# Patient Record
Sex: MALE | Race: WHITE | NOT HISPANIC OR LATINO | Employment: OTHER | URBAN - METROPOLITAN AREA
[De-identification: names, ages, dates, MRNs, and addresses within clinical notes are randomized per-mention and may not be internally consistent; named-entity substitution may affect disease eponyms.]

---

## 2019-10-21 ENCOUNTER — HOSPITAL ENCOUNTER (OUTPATIENT)
Facility: MEDICAL CENTER | Age: 84
DRG: 247 | End: 2019-10-21
Admitting: HOSPITALIST
Payer: MEDICARE

## 2019-10-22 ENCOUNTER — HOSPITAL ENCOUNTER (EMERGENCY)
Facility: MEDICAL CENTER | Age: 84
DRG: 247 | End: 2019-10-22
Attending: EMERGENCY MEDICINE
Payer: MEDICARE

## 2019-10-22 ENCOUNTER — APPOINTMENT (OUTPATIENT)
Dept: CARDIOLOGY | Facility: MEDICAL CENTER | Age: 84
DRG: 247 | End: 2019-10-22
Attending: INTERNAL MEDICINE
Payer: MEDICARE

## 2019-10-22 ENCOUNTER — HOSPITAL ENCOUNTER (INPATIENT)
Facility: MEDICAL CENTER | Age: 84
LOS: 2 days | DRG: 247 | End: 2019-10-24
Attending: HOSPITALIST | Admitting: HOSPITALIST
Payer: MEDICARE

## 2019-10-22 ENCOUNTER — APPOINTMENT (OUTPATIENT)
Dept: RADIOLOGY | Facility: MEDICAL CENTER | Age: 84
DRG: 247 | End: 2019-10-22
Attending: EMERGENCY MEDICINE
Payer: MEDICARE

## 2019-10-22 ENCOUNTER — HOSPITAL ENCOUNTER (OUTPATIENT)
Dept: RADIOLOGY | Facility: MEDICAL CENTER | Age: 84
End: 2019-10-22

## 2019-10-22 VITALS
HEART RATE: 93 BPM | DIASTOLIC BLOOD PRESSURE: 91 MMHG | TEMPERATURE: 98.3 F | OXYGEN SATURATION: 93 % | WEIGHT: 210 LBS | HEIGHT: 66 IN | SYSTOLIC BLOOD PRESSURE: 168 MMHG | RESPIRATION RATE: 20 BRPM | BODY MASS INDEX: 33.75 KG/M2

## 2019-10-22 DIAGNOSIS — I21.4 NSTEMI (NON-ST ELEVATED MYOCARDIAL INFARCTION) (HCC): ICD-10-CM

## 2019-10-22 PROBLEM — I48.91 ATRIAL FIBRILLATION (HCC): Status: ACTIVE | Noted: 2019-10-22

## 2019-10-22 PROBLEM — I10 ESSENTIAL HYPERTENSION: Status: ACTIVE | Noted: 2019-10-22

## 2019-10-22 PROBLEM — E78.5 HYPERLIPIDEMIA: Status: ACTIVE | Noted: 2019-10-22

## 2019-10-22 LAB
APTT PPP: 128.3 SEC (ref 24.7–36)
APTT PPP: 49.7 SEC (ref 24.7–36)
APTT PPP: 92.3 SEC (ref 24.7–36)
EKG IMPRESSION: NORMAL
EST. AVERAGE GLUCOSE BLD GHB EST-MCNC: 148 MG/DL
HBA1C MFR BLD: 6.8 % (ref 0–5.6)
INR PPP: 1.12 (ref 0.87–1.13)
LV EJECT FRACT  99904: 70
PROTHROMBIN TIME: 14.7 SEC (ref 12–14.6)
TROPONIN T SERPL-MCNC: 195 NG/L (ref 6–19)
TROPONIN T SERPL-MCNC: 243 NG/L (ref 6–19)
TSH SERPL DL<=0.005 MIU/L-ACNC: 0.59 UIU/ML (ref 0.38–5.33)

## 2019-10-22 PROCEDURE — 93306 TTE W/DOPPLER COMPLETE: CPT

## 2019-10-22 PROCEDURE — 302449 STATCHG TRIAGE ONLY (STATISTIC)

## 2019-10-22 PROCEDURE — 84484 ASSAY OF TROPONIN QUANT: CPT | Mod: 91

## 2019-10-22 PROCEDURE — 700117 HCHG RX CONTRAST REV CODE 255: Performed by: INTERNAL MEDICINE

## 2019-10-22 PROCEDURE — 700111 HCHG RX REV CODE 636 W/ 250 OVERRIDE (IP): Performed by: INTERNAL MEDICINE

## 2019-10-22 PROCEDURE — 93005 ELECTROCARDIOGRAM TRACING: CPT | Performed by: EMERGENCY MEDICINE

## 2019-10-22 PROCEDURE — 83036 HEMOGLOBIN GLYCOSYLATED A1C: CPT

## 2019-10-22 PROCEDURE — 71045 X-RAY EXAM CHEST 1 VIEW: CPT

## 2019-10-22 PROCEDURE — 700102 HCHG RX REV CODE 250 W/ 637 OVERRIDE(OP): Performed by: INTERNAL MEDICINE

## 2019-10-22 PROCEDURE — 99223 1ST HOSP IP/OBS HIGH 75: CPT | Performed by: INTERNAL MEDICINE

## 2019-10-22 PROCEDURE — A9270 NON-COVERED ITEM OR SERVICE: HCPCS | Performed by: INTERNAL MEDICINE

## 2019-10-22 PROCEDURE — 93306 TTE W/DOPPLER COMPLETE: CPT | Mod: 26 | Performed by: INTERNAL MEDICINE

## 2019-10-22 PROCEDURE — 84443 ASSAY THYROID STIM HORMONE: CPT

## 2019-10-22 PROCEDURE — 36415 COLL VENOUS BLD VENIPUNCTURE: CPT

## 2019-10-22 PROCEDURE — 85610 PROTHROMBIN TIME: CPT

## 2019-10-22 PROCEDURE — 99358 PROLONG SERVICE W/O CONTACT: CPT | Performed by: INTERNAL MEDICINE

## 2019-10-22 PROCEDURE — 85730 THROMBOPLASTIN TIME PARTIAL: CPT

## 2019-10-22 PROCEDURE — 770020 HCHG ROOM/CARE - TELE (206)

## 2019-10-22 PROCEDURE — 93010 ELECTROCARDIOGRAM REPORT: CPT | Performed by: INTERNAL MEDICINE

## 2019-10-22 RX ORDER — SENNOSIDES 8.6 MG
650 CAPSULE ORAL EVERY 6 HOURS PRN
COMMUNITY

## 2019-10-22 RX ORDER — NADOLOL 80 MG/1
80 TABLET ORAL 2 TIMES DAILY
Refills: 0 | Status: ON HOLD | COMMUNITY
Start: 2019-09-14 | End: 2019-10-24

## 2019-10-22 RX ORDER — NITROGLYCERIN 0.4 MG/1
0.4 TABLET SUBLINGUAL
Status: DISCONTINUED | OUTPATIENT
Start: 2019-10-22 | End: 2019-10-24 | Stop reason: HOSPADM

## 2019-10-22 RX ORDER — HEPARIN SODIUM 1000 [USP'U]/ML
3200 INJECTION, SOLUTION INTRAVENOUS; SUBCUTANEOUS PRN
Status: DISCONTINUED | OUTPATIENT
Start: 2019-10-22 | End: 2019-10-23

## 2019-10-22 RX ORDER — CEPHALEXIN 500 MG/1
500 CAPSULE ORAL 3 TIMES DAILY
Status: ON HOLD | COMMUNITY
End: 2019-10-22

## 2019-10-22 RX ORDER — ACETAMINOPHEN 325 MG/1
650 TABLET ORAL EVERY 4 HOURS PRN
Status: ON HOLD | COMMUNITY
End: 2019-10-22

## 2019-10-22 RX ORDER — ATORVASTATIN CALCIUM 80 MG/1
80 TABLET, FILM COATED ORAL EVERY EVENING
Status: DISCONTINUED | OUTPATIENT
Start: 2019-10-22 | End: 2019-10-24 | Stop reason: HOSPADM

## 2019-10-22 RX ORDER — AMOXICILLIN 250 MG
2 CAPSULE ORAL 2 TIMES DAILY
Status: DISCONTINUED | OUTPATIENT
Start: 2019-10-22 | End: 2019-10-24 | Stop reason: HOSPADM

## 2019-10-22 RX ORDER — POLYETHYLENE GLYCOL 3350 17 G/17G
1 POWDER, FOR SOLUTION ORAL
Status: DISCONTINUED | OUTPATIENT
Start: 2019-10-22 | End: 2019-10-24 | Stop reason: HOSPADM

## 2019-10-22 RX ORDER — CARVEDILOL 6.25 MG/1
6.25 TABLET ORAL 2 TIMES DAILY WITH MEALS
Status: DISCONTINUED | OUTPATIENT
Start: 2019-10-22 | End: 2019-10-24

## 2019-10-22 RX ORDER — ATORVASTATIN CALCIUM 40 MG/1
40 TABLET, FILM COATED ORAL EVERY EVENING
Status: DISCONTINUED | OUTPATIENT
Start: 2019-10-22 | End: 2019-10-22

## 2019-10-22 RX ORDER — NADOLOL 40 MG/1
80 TABLET ORAL 2 TIMES DAILY
Status: ON HOLD | COMMUNITY
End: 2019-10-22

## 2019-10-22 RX ORDER — HYDRALAZINE HYDROCHLORIDE 20 MG/ML
20 INJECTION INTRAMUSCULAR; INTRAVENOUS EVERY 6 HOURS PRN
Status: DISCONTINUED | OUTPATIENT
Start: 2019-10-22 | End: 2019-10-24 | Stop reason: HOSPADM

## 2019-10-22 RX ORDER — HEPARIN SODIUM 5000 [USP'U]/100ML
INJECTION, SOLUTION INTRAVENOUS CONTINUOUS
Status: DISCONTINUED | OUTPATIENT
Start: 2019-10-22 | End: 2019-10-23

## 2019-10-22 RX ORDER — BISACODYL 10 MG
10 SUPPOSITORY, RECTAL RECTAL
Status: DISCONTINUED | OUTPATIENT
Start: 2019-10-22 | End: 2019-10-24 | Stop reason: HOSPADM

## 2019-10-22 RX ORDER — AMLODIPINE BESYLATE 2.5 MG/1
2.5 TABLET ORAL DAILY
Status: ON HOLD | COMMUNITY
End: 2019-10-24

## 2019-10-22 RX ORDER — ASPIRIN 325 MG
650 TABLET ORAL ONCE
Status: ON HOLD | COMMUNITY
End: 2019-10-24

## 2019-10-22 RX ADMIN — CARVEDILOL 6.25 MG: 6.25 TABLET, FILM COATED ORAL at 17:48

## 2019-10-22 RX ADMIN — ATORVASTATIN CALCIUM 80 MG: 80 TABLET, FILM COATED ORAL at 17:48

## 2019-10-22 RX ADMIN — HEPARIN SODIUM 1200 UNITS/HR: 5000 INJECTION, SOLUTION INTRAVENOUS at 03:24

## 2019-10-22 RX ADMIN — HUMAN ALBUMIN MICROSPHERES AND PERFLUTREN 3 ML: 10; .22 INJECTION, SOLUTION INTRAVENOUS at 16:30

## 2019-10-22 RX ADMIN — CARVEDILOL 6.25 MG: 6.25 TABLET, FILM COATED ORAL at 04:29

## 2019-10-22 ASSESSMENT — ENCOUNTER SYMPTOMS
DIZZINESS: 0
NAUSEA: 0
BACK PAIN: 0
PALPITATIONS: 0
SPUTUM PRODUCTION: 0
FOCAL WEAKNESS: 0
SEIZURES: 0
MYALGIAS: 0
BLURRED VISION: 0
VOMITING: 0
BRUISES/BLEEDS EASILY: 0
CHILLS: 0
HEADACHES: 0
COUGH: 0
DIAPHORESIS: 1
DIARRHEA: 0
ABDOMINAL PAIN: 0
SORE THROAT: 0
FEVER: 0
BLOOD IN STOOL: 0
NECK PAIN: 0
WHEEZING: 0
SHORTNESS OF BREATH: 1
FLANK PAIN: 0

## 2019-10-22 ASSESSMENT — LIFESTYLE VARIABLES
EVER_SMOKED: YES
AVERAGE NUMBER OF DAYS PER WEEK YOU HAVE A DRINK CONTAINING ALCOHOL: 0
EVER_SMOKED: YES
TOTAL SCORE: 0
ON A TYPICAL DAY WHEN YOU DRINK ALCOHOL HOW MANY DRINKS DO YOU HAVE: 0
EVER FELT BAD OR GUILTY ABOUT YOUR DRINKING: NO
HAVE YOU EVER FELT YOU SHOULD CUT DOWN ON YOUR DRINKING: NO
HAVE PEOPLE ANNOYED YOU BY CRITICIZING YOUR DRINKING: NO
EVER HAD A DRINK FIRST THING IN THE MORNING TO STEADY YOUR NERVES TO GET RID OF A HANGOVER: NO
TOTAL SCORE: 0
DOES PATIENT WANT TO STOP DRINKING: NO
ALCOHOL_USE: NO
CONSUMPTION TOTAL: NEGATIVE
TOTAL SCORE: 0
HOW MANY TIMES IN THE PAST YEAR HAVE YOU HAD 5 OR MORE DRINKS IN A DAY: 0

## 2019-10-22 ASSESSMENT — COGNITIVE AND FUNCTIONAL STATUS - GENERAL
SUGGESTED CMS G CODE MODIFIER DAILY ACTIVITY: CI
SUGGESTED CMS G CODE MODIFIER MOBILITY: CI
DRESSING REGULAR LOWER BODY CLOTHING: A LITTLE
DAILY ACTIVITIY SCORE: 23
MOBILITY SCORE: 23
CLIMB 3 TO 5 STEPS WITH RAILING: A LITTLE

## 2019-10-22 ASSESSMENT — PAIN DESCRIPTION - DESCRIPTORS: DESCRIPTORS: PRESSURE

## 2019-10-22 ASSESSMENT — COPD QUESTIONNAIRES
COPD SCREENING SCORE: 5
DO YOU EVER COUGH UP ANY MUCUS OR PHLEGM?: NO/ONLY WITH OCCASIONAL COLDS OR INFECTIONS
HAVE YOU SMOKED AT LEAST 100 CIGARETTES IN YOUR ENTIRE LIFE: YES
COPD SCREENING SCORE: 5
IN THE PAST 12 MONTHS DO YOU DO LESS THAN YOU USED TO BECAUSE OF YOUR BREATHING PROBLEMS: DISAGREE/UNSURE
DURING THE PAST 4 WEEKS HOW MUCH DID YOU FEEL SHORT OF BREATH: SOME OF THE TIME
DO YOU EVER COUGH UP ANY MUCUS OR PHLEGM?: NO/ONLY WITH OCCASIONAL COLDS OR INFECTIONS
DURING THE PAST 4 WEEKS HOW MUCH DID YOU FEEL SHORT OF BREATH: SOME OF THE TIME
HAVE YOU SMOKED AT LEAST 100 CIGARETTES IN YOUR ENTIRE LIFE: YES

## 2019-10-22 ASSESSMENT — PATIENT HEALTH QUESTIONNAIRE - PHQ9
SUM OF ALL RESPONSES TO PHQ9 QUESTIONS 1 AND 2: 0
1. LITTLE INTEREST OR PLEASURE IN DOING THINGS: NOT AT ALL
2. FEELING DOWN, DEPRESSED, IRRITABLE, OR HOPELESS: NOT AT ALL

## 2019-10-22 NOTE — ED TRIAGE NOTES
"Melissa Rust     Chief Complaint   Patient presents with   • Sent by MD     [facility]   • Chest Pain     elevated troponin       Patient biba for above complaint. Patient c/o chest pressure over the last couple days, but around 1230PM yesterday, the pain increased and patient called 911.    [facility] gave patient 324 ASA, NTG 0.4mg and patient states his pain subsided. Patients original troponin was .001 per then elevated to 1.39 per [facility]. Patient was then started on a heparin drip at 1000 units/min. Patient has received a total of 125mL of heparin on arrival, denies CP or SOB at this time.     Patient is AOx4, Hx A fib and HTN    Blood Pressure : 150/100, Pulse: 83, Respiration: 20, Temperature: 36.8 °C (98.3 °F), Height: 167.6 cm (5' 6\"), Weight: 95.3 kg (210 lb), BMI (Calculated): 33.89, BSA (Calculated): 2.1, Pulse Oximetry: 97 %, O2 (LPM): 0, O2 Delivery: None (Room Air)   "

## 2019-10-22 NOTE — ED PROVIDER NOTES
ED Provider Note    Patient was supposed to be a direct admit upstairs and instead was brought down the emergency department.  Patient is stable.  Patient was not seen by emergency provider and will be directly admitted as planned.

## 2019-10-22 NOTE — PROGRESS NOTES
Bedside report received, assumed pt care @5504. Pt A&Ox4. He denies any pain at this time. POC discussed, he verbalized understanding. Call light within reach

## 2019-10-22 NOTE — ASSESSMENT & PLAN NOTE
- Started on Coreg and IV heparin  - echo pending  - will need anticoagulation post cath procedure

## 2019-10-22 NOTE — H&P
Hospital Medicine History & Physical Note    Date of Service  10/22/2019    Primary Care Physician  No primary care provider on file.    Consultants  Cardiology    Code Status  Full code    Chief Complaint  Chest pain    History of Presenting Illness  85 y.o. male who presented 10/22/2019 with chest pain that started yesterday.  The patient reports substernal chest pressure without radiation associated with shortness of breath.  He also reported symptoms of diaphoresis.  He denied any relieving or exacerbating factors.  He denies any fevers, chills, cough or lightheadedness.  Denies any previous history of MI, stroke or PE.  He is not on any blood thinners.  He does not smoke.  He denies any family history of MI.      EKG interpreted by me reveals atrial fibrillation with controlled ventricular response.  No ST elevation or ST depression noted    He presented to [facility]. I have reviewed the medical records from the transferring facility which are summarized as follows:    Sodium 139, potassium 4.1, chloride 105, CO2 28, anion gap 6, glucose 164, BUN 11, creatinine 0.8, calcium 8.9, alkaline phosphatase 127, ALT 18, AST 26, albumin 3.5, total protein 7.8, total bili 2.3, mag 1.9    Troponin 0.017> 0.325> 1.399    WBC 6.2, hemoglobin 19.5, hematocrit 57.5, , platelets 184  PT 10.7, INR 1.1    UA: Trace leukocyte esterase, nitrite negative, WBC 0-5, bacteria trace    Chest x-ray: No acute cardiopulmonary process    Patient was given aspirin 324 mg.  Patient was started on IV heparin and beta-blockers.  The transferring physician discussed the case with cardiology , who will consult on the patient    Review of Systems  Review of Systems   Constitutional: Positive for diaphoresis. Negative for chills and fever.   HENT: Negative for hearing loss and sore throat.    Eyes: Negative for blurred vision.   Respiratory: Positive for shortness of breath. Negative for cough, sputum production and  wheezing.    Cardiovascular: Positive for chest pain. Negative for palpitations and leg swelling.   Gastrointestinal: Negative for abdominal pain, blood in stool, diarrhea, nausea and vomiting.   Genitourinary: Negative for dysuria, flank pain and urgency.   Musculoskeletal: Negative for back pain, joint pain, myalgias and neck pain.   Skin: Negative for rash.   Neurological: Negative for dizziness, focal weakness, seizures and headaches.   Endo/Heme/Allergies: Does not bruise/bleed easily.   Psychiatric/Behavioral: Negative for suicidal ideas.   All other systems reviewed and are negative.      Past Medical History   has a past medical history of Atrial fibrillation (HCC) and Hypertension.    Surgical History  No pertinent surgical history    Family History  No pertinent family history    Social History   reports that he has never smoked. He has never used smokeless tobacco. He reports that he drank alcohol. He reports that he does not use drugs.    Allergies  NKDA    Medications  Prior to Admission Medications   Prescriptions Last Dose Informant Patient Reported? Taking?   acetaminophen (TYLENOL) 325 MG Tab 10/22/2019 at 0800  Yes Yes   Sig: Take 650 mg by mouth every four hours as needed.   amLODIPine (NORVASC) 2.5 MG Tab 10/22/2019 at 1500  Yes Yes   Sig: Take 2.5 mg by mouth 2 times a day.   aspirin EC (ECOTRIN) 81 MG Tablet Delayed Response   Yes No   Sig: Take 81 mg by mouth every day.   cephALEXin (KEFLEX) 500 MG Cap   Yes Yes   Sig: Take 500 mg by mouth 3 times a day.   nadolol (CORGARD) 40 MG Tab 10/22/2019 at 1500  Yes Yes   Sig: Take 80 mg by mouth 2 times a day.      Facility-Administered Medications: None       Physical Exam  Temp:  [36.7 °C (98 °F)] 36.7 °C (98 °F)  Pulse:  [84-98] 84  Resp:  [16] 16  BP: (164)/(105) 164/105  SpO2:  [95 %-99 %] 99 %    Physical Exam   Constitutional: He is oriented to person, place, and time. He appears well-developed and well-nourished. No distress.   HENT:   Head:  Normocephalic and atraumatic.   Mouth/Throat: Oropharynx is clear and moist.   Eyes: Pupils are equal, round, and reactive to light. Conjunctivae are normal. No scleral icterus.   Neck: Normal range of motion. Neck supple.   Cardiovascular: Normal rate and normal heart sounds.   Irregular   Pulmonary/Chest: Effort normal and breath sounds normal. No respiratory distress. He has no wheezes. He has no rales.   Abdominal: Soft. Bowel sounds are normal. He exhibits no distension. There is no tenderness. There is no rebound.   Musculoskeletal: Normal range of motion. He exhibits no edema or tenderness.   Lymphadenopathy:     He has no cervical adenopathy.   Neurological: He is alert and oriented to person, place, and time. No cranial nerve deficit. Coordination normal.   Skin: Skin is warm. No rash noted.   Psychiatric: He has a normal mood and affect. His behavior is normal.   Nursing note and vitals reviewed.      Laboratory:          No results for input(s): ALTSGPT, ASTSGOT, ALKPHOSPHAT, TBILIRUBIN, DBILIRUBIN, GAMMAGT, AMYLASE, LIPASE, ALB, PREALBUMIN, GLUCOSE in the last 72 hours.      No results for input(s): NTPROBNP in the last 72 hours.      No results for input(s): TROPONINT in the last 72 hours.    Urinalysis:    No results found     Imaging:  No orders to display         Assessment/Plan:  I anticipate this patient will require at least two midnights for appropriate medical management, necessitating inpatient admission.    NSTEMI (non-ST elevated myocardial infarction) ([facility])- (present on admission)  Assessment & Plan  Patient will be admitted to the telemetry unit with close cardiac monitoring, serial EKG and troponin  Patient has been given full dose of aspirin and is started on IV heparin, monitor APTT  I will start the patient on coreg 6.25 mg twice daily and atorvastatin 80 mg daily  Check 2D echo, lipid panel, TSH and hemoglobin A1c  Nitro and morphine when necessary for chest pain  Cardiology has been  consulted      Atrial fibrillation (HCC)- (present on admission)  Assessment & Plan  Started on Coreg and IV heparin  Check 2D echo  Check TSH    Hyperlipidemia- (present on admission)  Assessment & Plan  Started on atorvastatin 80    Essential hypertension- (present on admission)  Assessment & Plan  Uncontrolled  I started the patient on Coreg 6.25 mg twice daily  IV hydralazine PRN for SBP greater than 160      VTE prophylaxis: Heparin    I spent a total of 30 minutes of non face to face time performing additional research, reviewing medical records from transferring facility, discussing plan of care with other healthcare providers. Start time: 1 10 am. End time: 1 40 am

## 2019-10-22 NOTE — ASSESSMENT & PLAN NOTE
Uncontrolled, labile  - tolerating coreg 6.25mg BID  - starting lisinopril 10mg daily, with holding parameters  - IV hydralazine PRN for SBP greater than 160

## 2019-10-22 NOTE — CONSULTS
Reason of Consult: NSTEMI    Consulting Physician: Dr. Apodaca    HPI:  85M with a history of hyperlipidemia and hypertension presents from [facility] entrance for with the diagnosis of non-STEMI.  Presented 10/22/2019 to the outside facility noting substernal chest pressure without radiation associated with shortness of breath and diaphoresis that occurred suddenly and lasted until his presentation to the outside facility.  Had never occurred before.  He is a non-smoker does not drink excessively does not do drugs and denies a family history of precocious CAD.  He is currently asymptomatic without recurrent chest pain.  Troponins were mildly abnormal and EKG interpreted as below.    Past Medical History:   Diagnosis Date   • Atrial fibrillation (HCC)    • Hypertension        Social History     Socioeconomic History   • Marital status:      Spouse name: Not on file   • Number of children: Not on file   • Years of education: Not on file   • Highest education level: Not on file   Occupational History   • Not on file   Social Needs   • Financial resource strain: Not on file   • Food insecurity:     Worry: Not on file     Inability: Not on file   • Transportation needs:     Medical: Not on file     Non-medical: Not on file   Tobacco Use   • Smoking status: Never Smoker   • Smokeless tobacco: Never Used   Substance and Sexual Activity   • Alcohol use: Not Currently   • Drug use: Never   • Sexual activity: Not on file   Lifestyle   • Physical activity:     Days per week: Not on file     Minutes per session: Not on file   • Stress: Not on file   Relationships   • Social connections:     Talks on phone: Not on file     Gets together: Not on file     Attends Anabaptist service: Not on file     Active member of club or organization: Not on file     Attends meetings of clubs or organizations: Not on file     Relationship status: Not on file   • Intimate partner violence:     Fear of current or ex partner: Not  on file     Emotionally abused: Not on file     Physically abused: Not on file     Forced sexual activity: Not on file   Other Topics Concern   • Not on file   Social History Narrative   • Not on file       No current facility-administered medications on file prior to encounter.      Current Outpatient Medications on File Prior to Encounter   Medication Sig Dispense Refill   • amLODIPine (NORVASC) 2.5 MG Tab Take 2.5 mg by mouth every day.     • aspirin (ASA) 325 MG Tab Take 650 mg by mouth Once.     • acetaminophen (TYLENOL 8 HOUR) 650 MG CR tablet Take 650 mg by mouth every 6 hours as needed.     • nadolol (CORGARD) 80 MG tablet Take 80 mg by mouth 2 Times a Day.  0       Current Facility-Administered Medications   Medication Dose Frequency Provider Last Rate Last Dose   • senna-docusate (PERICOLACE or SENOKOT S) 8.6-50 MG per tablet 2 Tab  2 Tab BID Donald Lee M.D.        And   • polyethylene glycol/lytes (MIRALAX) PACKET 1 Packet  1 Packet QDAY PRN Donald Lee M.D.        And   • magnesium hydroxide (MILK OF MAGNESIA) suspension 30 mL  30 mL QDAY PRN Donald Lee M.D.        And   • bisacodyl (DULCOLAX) suppository 10 mg  10 mg QDAY PRN Donald Lee M.D.       • Respiratory Care per Protocol   Continuous RT Donald Lee M.D.       • [START ON 10/23/2019] aspirin EC (ECOTRIN) tablet 81 mg  81 mg DAILY Donald Lee M.D.       • nitroglycerin (NITROSTAT) tablet 0.4 mg  0.4 mg Q5 MIN PRN Donald Lee M.D.       • heparin injection 3,200 Units  3,200 Units PRN Donald Lee M.D.        And   • heparin infusion 25,000 units in 500 mL 0.45% NACL   Continuous Donald Lee M.D. 24 mL/hr at 10/22/19 1119 1,200 Units/hr at 10/22/19 1119   • Influenza Vaccine High-Dose pf injection 0.5 mL  0.5 mL Once Donald Lee M.D.       • carvedilol (COREG) tablet 6.25 mg  6.25 mg BID WITH MEALS Donald Lee M.D.   6.25 mg at 10/22/19 0429   • atorvastatin (LIPITOR) tablet 80 mg  80 mg Q EVENING Donald Lee M.D.       • hydrALAZINE  "(APRESOLINE) injection 20 mg  20 mg Q6HRS PRN Donald Lee M.D.         Last reviewed on 10/22/2019 11:02 AM by Irma Ho, JwT    Allergies: Juniper tar    No family history on file.    ROS: As per HPI all other systems reviewed and negative     Physical Exam   Blood pressure 113/78, pulse 86, temperature 37.1 °C (98.7 °F), temperature source Temporal, resp. rate 16, height 1.676 m (5' 6\"), weight 97.2 kg (214 lb 4.6 oz), SpO2 94 %.    Constitutional: Appears well-developed.   HENT: Normocephalic and atraumatic. No scleral icterus.   Neck: No JVD present.   Cardiovascular: Normal rate. Exam reveals no gallop and no friction rub. No murmur heard.   Pulmonary/Chest: CTAB    Abdominal: S/NT/ND BS+   Musculoskeletal:  Pulses present. No atrophy. Strength normal.  Extremities: Exhibits no edema. No clubbing or cyanosis.   Skin: Skin is warm and dry.   Neuro: Non-focal, CN 2-12 intact grossly      Intake/Output Summary (Last 24 hours) at 10/22/2019 1306  Last data filed at 10/22/2019 0748  Gross per 24 hour   Intake --   Output 200 ml   Net -200 ml               Recent Labs     10/22/19  0252 10/22/19  0957   APTT 49.7* 92.3*   INR 1.12  --                    EKG (10/22/2019):  I have personally reviewed the EKG this visit and discussed with the patient. It shows atrial fibrillation 91 bpm with nonspecific ST-T wave changes.    Imaging reviewed    Impressions:  1.  Non-STEMI  2.  Atrial fibrillation with controlled ventricular response  3.  Hypertension  4.  Hyperlipidemia    Recommendations:  It appears that the patient has suffered a non-STEMI without clear provocation.  The atrial fibrillation is previously unknown to the patient and it is possible he had new onset atrial fibrillation with an initial rapid rate subsequently improving to rate controlled AF that could have driven a demand event.  He has an elevated stroke risk score and therefore post procedure oral anticoagulation will be recommended for stroke " prevention.  I do recommend early coronary angiography and continuing his heparin infusion until such time as this can be completed.  Recommend guideline directed medical therapy as below as well.    1.  Echocardiogram  2.  N.p.o. after midnight for coronary angiography with possible PCI tomorrow  3.  Aspirin, high-dose statin, Coreg, initiate ACE inhibitor  4.  Post procedure oral anticoagulation will be addressed  5.  Recommend routine daily laboratory evaluation to include CBC and BM as well as coags.    Thank you for this interesting consultation. It was my pleasure to see Mleissa Rust today.    Yovani Tinajero MD, FACC, [facility]  Division of Interventional Cardiology  [facility]      Discussed with the referring physician and bedside nursing.

## 2019-10-22 NOTE — ED NOTES
Per Charge RN, patient was supposed to be a direct admit. Patient will be DC from ER and taken to 831-2. Updated patient on POC

## 2019-10-22 NOTE — PROGRESS NOTES
Pt brought to the floor from [facility]. Monitor place on pt, monitor room notified. Pt able to ambulate from gurney to bed with steady gait. POC discussed with pt, all questions answered at this time. Pt is on 0L O2. A/O x4. Pt is currently on a heparin drip. MD is aware patient has arrived and has updated his orders. Med rec has been complete and patient baseline vitals have been taken. Pt has no other needs at this time.

## 2019-10-22 NOTE — CARE PLAN
Problem: Communication  Goal: The ability to communicate needs accurately and effectively will improve  Outcome: PROGRESSING AS EXPECTED  Intervention: [place] patient and significant other/support system to call light to alert staff of needs  Flowsheets (Taken 10/22/2019 0340)  Oriented to:: All of the Following : Location of Bathroom, Visiting Policy, Unit Routine, Call Light and Bedside Controls, Bedside Rail Policy, Smoking Policy, Rights and Responsibilities, Bedside Report, and Patient Education Notebook  Note:   Pt educated on POC and medications. Pt verbalized understanding.      Problem: Safety  Goal: Will remain free from falls  Outcome: PROGRESSING AS EXPECTED  Intervention: Assess risk factors for falls  Flowsheets (Taken 10/22/2019 0340)  Pt Calls for Assistance: Yes  Fall Risk: Risk to Fall -  0 - 1 point  History of fall: 0  Mobility Status Assessment: 0-Ambulates & Transfers Independently. No Assistance Required  Risk for Injury-Any positive answers results in the pt being at high risk for fall related injury: Not Applicable  Note:   Pt bedside table and call-light are within reach, bed in lowest position and locked. Treaded socks on and pt has been educated on call light use and asked to call before getting up.

## 2019-10-22 NOTE — ED NOTES
Pt was to be a direct admit, charge Rn did not get this in report, pt will be discharged and taken to correct room.

## 2019-10-22 NOTE — PROGRESS NOTES
Pt came in with Heparin drip running at 1000 units/hr. Dr reordered drip, I verified with pharmacy and we are to stop the drip that is running and restart the heparin drip per our hospital protocol with a rebolus. Aptt reordered. Unable to draw labs at this time as the patient armband has incorrect information. Patient armband reordered. Will restart heparin drip per hospital protocol when APTT redrawn. MD updated.

## 2019-10-22 NOTE — PROGRESS NOTES
2 RN Skin Check    2 RN skin check complete. With Jose A  Devices in place: NA.  Skin assessed under devices: N\A.  Confirmed pressure ulcers found on: NA.  New potential pressure ulcers noted on NA. Wound consult placed N/A.  The following interventions in place Pillows.    Pt has red and blanching heels bilaterally.   Elbows dry  Ears pink and blanching bilaterally    No other skin breakdown noted at this time

## 2019-10-22 NOTE — PROGRESS NOTES
Patient was seen and examined by my colleague after midnight. Please refer to the H&P done by Dr. Lee on 10/22/2019 for more details.    I personally saw and examined the patient today. Discussed case with cardiology, who was not yet aware of the patient. Plan for cardiac cath tomorrow AM. Patient is hungry and would like a diet order.

## 2019-10-22 NOTE — ASSESSMENT & PLAN NOTE
Substernal CP associated with SOB. TSH normal, A1C slightly elevated.   - cardiology following, appreciate recs  - started on statin, BB and ACEi  - s/p heparin drip  - plan for cath lab today and possible intervention  - monitor on telemetry

## 2019-10-22 NOTE — PROGRESS NOTES
Med rec complete per patient  Allergies reviewed  NO PO antibiotics in last 14 days    Patient does not normally take ASA but took 650 mg 10-21-19 when he felt he needed it    Informed H of changes

## 2019-10-22 NOTE — PROGRESS NOTES
Direct admit from [facility], referred by Dr. Payne For NSTEMI. Admitting MD is Dr. Oconnor. Upon patient's arrival release signed and held orders, page admit hospitalist on call for orders.

## 2019-10-23 ENCOUNTER — APPOINTMENT (OUTPATIENT)
Dept: CARDIOLOGY | Facility: MEDICAL CENTER | Age: 84
DRG: 247 | End: 2019-10-23
Attending: INTERNAL MEDICINE
Payer: MEDICARE

## 2019-10-23 PROBLEM — R73.09 ELEVATED HEMOGLOBIN A1C: Status: ACTIVE | Noted: 2019-10-23

## 2019-10-23 LAB
ACT BLD: 334 SEC (ref 74–137)
ACT BLD: >1000 SEC (ref 74–137)
ALBUMIN SERPL BCP-MCNC: 3.3 G/DL (ref 3.2–4.9)
ALBUMIN/GLOB SERPL: 1.1 G/DL
ALP SERPL-CCNC: 84 U/L (ref 30–99)
ALT SERPL-CCNC: 12 U/L (ref 2–50)
ANION GAP SERPL CALC-SCNC: 8 MMOL/L (ref 0–11.9)
ANION GAP SERPL CALC-SCNC: 8 MMOL/L (ref 0–11.9)
APTT PPP: 73.4 SEC (ref 24.7–36)
APTT PPP: 92.8 SEC (ref 24.7–36)
AST SERPL-CCNC: 24 U/L (ref 12–45)
BILIRUB SERPL-MCNC: 3.2 MG/DL (ref 0.1–1.5)
BUN SERPL-MCNC: 16 MG/DL (ref 8–22)
BUN SERPL-MCNC: 16 MG/DL (ref 8–22)
CALCIUM SERPL-MCNC: 8.5 MG/DL (ref 8.5–10.5)
CALCIUM SERPL-MCNC: 8.5 MG/DL (ref 8.5–10.5)
CHLORIDE SERPL-SCNC: 105 MMOL/L (ref 96–112)
CHLORIDE SERPL-SCNC: 105 MMOL/L (ref 96–112)
CHOLEST SERPL-MCNC: 143 MG/DL (ref 100–199)
CO2 SERPL-SCNC: 23 MMOL/L (ref 20–33)
CO2 SERPL-SCNC: 23 MMOL/L (ref 20–33)
CREAT SERPL-MCNC: 0.84 MG/DL (ref 0.5–1.4)
CREAT SERPL-MCNC: 0.84 MG/DL (ref 0.5–1.4)
EKG IMPRESSION: NORMAL
ERYTHROCYTE [DISTWIDTH] IN BLOOD BY AUTOMATED COUNT: 47.3 FL (ref 35.9–50)
GLOBULIN SER CALC-MCNC: 2.9 G/DL (ref 1.9–3.5)
GLUCOSE SERPL-MCNC: 112 MG/DL (ref 65–99)
GLUCOSE SERPL-MCNC: 112 MG/DL (ref 65–99)
HCT VFR BLD AUTO: 51.4 % (ref 42–52)
HDLC SERPL-MCNC: 31 MG/DL
HGB BLD-MCNC: 17 G/DL (ref 14–18)
INR PPP: 1.08 (ref 0.87–1.13)
LDLC SERPL CALC-MCNC: 86 MG/DL
MCH RBC QN AUTO: 32.3 PG (ref 27–33)
MCHC RBC AUTO-ENTMCNC: 33.1 G/DL (ref 33.7–35.3)
MCV RBC AUTO: 97.5 FL (ref 81.4–97.8)
PLATELET # BLD AUTO: 143 K/UL (ref 164–446)
PMV BLD AUTO: 10.5 FL (ref 9–12.9)
POTASSIUM SERPL-SCNC: 3.8 MMOL/L (ref 3.6–5.5)
POTASSIUM SERPL-SCNC: 3.8 MMOL/L (ref 3.6–5.5)
PROT SERPL-MCNC: 6.2 G/DL (ref 6–8.2)
PROTHROMBIN TIME: 14.3 SEC (ref 12–14.6)
RBC # BLD AUTO: 5.27 M/UL (ref 4.7–6.1)
SODIUM SERPL-SCNC: 136 MMOL/L (ref 135–145)
SODIUM SERPL-SCNC: 136 MMOL/L (ref 135–145)
TRIGL SERPL-MCNC: 129 MG/DL (ref 0–149)
WBC # BLD AUTO: 6.8 K/UL (ref 4.8–10.8)

## 2019-10-23 PROCEDURE — 770020 HCHG ROOM/CARE - TELE (206)

## 2019-10-23 PROCEDURE — A9270 NON-COVERED ITEM OR SERVICE: HCPCS | Performed by: INTERNAL MEDICINE

## 2019-10-23 PROCEDURE — 93010 ELECTROCARDIOGRAM REPORT: CPT | Performed by: INTERNAL MEDICINE

## 2019-10-23 PROCEDURE — 700105 HCHG RX REV CODE 258: Performed by: INTERNAL MEDICINE

## 2019-10-23 PROCEDURE — 027034Z DILATION OF CORONARY ARTERY, ONE ARTERY WITH DRUG-ELUTING INTRALUMINAL DEVICE, PERCUTANEOUS APPROACH: ICD-10-PCS | Performed by: INTERNAL MEDICINE

## 2019-10-23 PROCEDURE — 700102 HCHG RX REV CODE 250 W/ 637 OVERRIDE(OP)

## 2019-10-23 PROCEDURE — A9270 NON-COVERED ITEM OR SERVICE: HCPCS | Performed by: HOSPITALIST

## 2019-10-23 PROCEDURE — 700101 HCHG RX REV CODE 250

## 2019-10-23 PROCEDURE — 93005 ELECTROCARDIOGRAM TRACING: CPT | Performed by: INTERNAL MEDICINE

## 2019-10-23 PROCEDURE — A9270 NON-COVERED ITEM OR SERVICE: HCPCS

## 2019-10-23 PROCEDURE — B2111ZZ FLUOROSCOPY OF MULTIPLE CORONARY ARTERIES USING LOW OSMOLAR CONTRAST: ICD-10-PCS | Performed by: INTERNAL MEDICINE

## 2019-10-23 PROCEDURE — 85027 COMPLETE CBC AUTOMATED: CPT

## 2019-10-23 PROCEDURE — 93458 L HRT ARTERY/VENTRICLE ANGIO: CPT | Mod: 26,59 | Performed by: INTERNAL MEDICINE

## 2019-10-23 PROCEDURE — C1887 CATHETER, GUIDING: HCPCS

## 2019-10-23 PROCEDURE — 85730 THROMBOPLASTIN TIME PARTIAL: CPT

## 2019-10-23 PROCEDURE — 85347 COAGULATION TIME ACTIVATED: CPT | Mod: 91

## 2019-10-23 PROCEDURE — 700102 HCHG RX REV CODE 250 W/ 637 OVERRIDE(OP): Performed by: HOSPITALIST

## 2019-10-23 PROCEDURE — 36415 COLL VENOUS BLD VENIPUNCTURE: CPT

## 2019-10-23 PROCEDURE — B2151ZZ FLUOROSCOPY OF LEFT HEART USING LOW OSMOLAR CONTRAST: ICD-10-PCS | Performed by: INTERNAL MEDICINE

## 2019-10-23 PROCEDURE — 700111 HCHG RX REV CODE 636 W/ 250 OVERRIDE (IP)

## 2019-10-23 PROCEDURE — 99152 MOD SED SAME PHYS/QHP 5/>YRS: CPT | Performed by: INTERNAL MEDICINE

## 2019-10-23 PROCEDURE — 4A023N7 MEASUREMENT OF CARDIAC SAMPLING AND PRESSURE, LEFT HEART, PERCUTANEOUS APPROACH: ICD-10-PCS | Performed by: INTERNAL MEDICINE

## 2019-10-23 PROCEDURE — 700117 HCHG RX CONTRAST REV CODE 255: Performed by: INTERNAL MEDICINE

## 2019-10-23 PROCEDURE — 80053 COMPREHEN METABOLIC PANEL: CPT

## 2019-10-23 PROCEDURE — 700102 HCHG RX REV CODE 250 W/ 637 OVERRIDE(OP): Performed by: INTERNAL MEDICINE

## 2019-10-23 PROCEDURE — 700111 HCHG RX REV CODE 636 W/ 250 OVERRIDE (IP): Performed by: INTERNAL MEDICINE

## 2019-10-23 PROCEDURE — 92928 PRQ TCAT PLMT NTRAC ST 1 LES: CPT | Mod: LC | Performed by: INTERNAL MEDICINE

## 2019-10-23 PROCEDURE — 80061 LIPID PANEL: CPT

## 2019-10-23 PROCEDURE — 99232 SBSQ HOSP IP/OBS MODERATE 35: CPT | Performed by: HOSPITALIST

## 2019-10-23 PROCEDURE — 85610 PROTHROMBIN TIME: CPT

## 2019-10-23 RX ORDER — LISINOPRIL 5 MG/1
10 TABLET ORAL
Status: DISCONTINUED | OUTPATIENT
Start: 2019-10-23 | End: 2019-10-24

## 2019-10-23 RX ORDER — CLOPIDOGREL 300 MG/1
TABLET, FILM COATED ORAL
Status: COMPLETED
Start: 2019-10-23 | End: 2019-10-23

## 2019-10-23 RX ORDER — SODIUM CHLORIDE 9 MG/ML
INJECTION, SOLUTION INTRAVENOUS CONTINUOUS
Status: DISCONTINUED | OUTPATIENT
Start: 2019-10-23 | End: 2019-10-23

## 2019-10-23 RX ORDER — CLOPIDOGREL 300 MG/1
600 TABLET, FILM COATED ORAL ONCE
Status: DISCONTINUED | OUTPATIENT
Start: 2019-10-23 | End: 2019-10-23

## 2019-10-23 RX ORDER — SODIUM CHLORIDE 9 MG/ML
INJECTION, SOLUTION INTRAVENOUS CONTINUOUS
Status: DISCONTINUED | OUTPATIENT
Start: 2019-10-23 | End: 2019-10-23 | Stop reason: ALTCHOICE

## 2019-10-23 RX ORDER — MIDAZOLAM HYDROCHLORIDE 1 MG/ML
INJECTION INTRAMUSCULAR; INTRAVENOUS
Status: COMPLETED
Start: 2019-10-23 | End: 2019-10-23

## 2019-10-23 RX ORDER — HEPARIN SODIUM,PORCINE 1000/ML
VIAL (ML) INJECTION
Status: COMPLETED
Start: 2019-10-23 | End: 2019-10-23

## 2019-10-23 RX ORDER — LIDOCAINE HYDROCHLORIDE 20 MG/ML
INJECTION, SOLUTION INFILTRATION; PERINEURAL
Status: COMPLETED
Start: 2019-10-23 | End: 2019-10-23

## 2019-10-23 RX ORDER — CLOPIDOGREL BISULFATE 75 MG/1
75 TABLET ORAL DAILY
Status: DISCONTINUED | OUTPATIENT
Start: 2019-10-24 | End: 2019-10-24 | Stop reason: HOSPADM

## 2019-10-23 RX ORDER — VERAPAMIL HYDROCHLORIDE 2.5 MG/ML
INJECTION, SOLUTION INTRAVENOUS
Status: COMPLETED
Start: 2019-10-23 | End: 2019-10-23

## 2019-10-23 RX ORDER — HEPARIN SODIUM 200 [USP'U]/100ML
INJECTION, SOLUTION INTRAVENOUS
Status: COMPLETED
Start: 2019-10-23 | End: 2019-10-23

## 2019-10-23 RX ORDER — SODIUM CHLORIDE 9 MG/ML
INJECTION, SOLUTION INTRAVENOUS CONTINUOUS
Status: ACTIVE | OUTPATIENT
Start: 2019-10-23 | End: 2019-10-23

## 2019-10-23 RX ADMIN — HEPARIN SODIUM 2000 UNITS: 200 INJECTION, SOLUTION INTRAVENOUS at 11:53

## 2019-10-23 RX ADMIN — SODIUM CHLORIDE: 9 INJECTION, SOLUTION INTRAVENOUS at 13:49

## 2019-10-23 RX ADMIN — IOHEXOL 105 ML: 350 INJECTION, SOLUTION INTRAVENOUS at 12:45

## 2019-10-23 RX ADMIN — VERAPAMIL HYDROCHLORIDE 5 MG: 2.5 INJECTION, SOLUTION INTRAVENOUS at 11:53

## 2019-10-23 RX ADMIN — MIDAZOLAM HYDROCHLORIDE 2 MG: 1 INJECTION, SOLUTION INTRAMUSCULAR; INTRAVENOUS at 12:46

## 2019-10-23 RX ADMIN — SODIUM CHLORIDE 1000 ML: 9 INJECTION, SOLUTION INTRAVENOUS at 04:43

## 2019-10-23 RX ADMIN — LISINOPRIL 10 MG: 5 TABLET ORAL at 13:45

## 2019-10-23 RX ADMIN — ASPIRIN 81 MG: 81 TABLET, COATED ORAL at 04:49

## 2019-10-23 RX ADMIN — FENTANYL CITRATE 10075 MCG: 50 INJECTION INTRAMUSCULAR; INTRAVENOUS at 12:46

## 2019-10-23 RX ADMIN — HEPARIN SODIUM 950 UNITS/HR: 5000 INJECTION, SOLUTION INTRAVENOUS at 02:57

## 2019-10-23 RX ADMIN — ATORVASTATIN CALCIUM 80 MG: 80 TABLET, FILM COATED ORAL at 17:44

## 2019-10-23 RX ADMIN — CARVEDILOL 6.25 MG: 6.25 TABLET, FILM COATED ORAL at 08:47

## 2019-10-23 RX ADMIN — HEPARIN SODIUM: 1000 INJECTION, SOLUTION INTRAVENOUS; SUBCUTANEOUS at 11:54

## 2019-10-23 RX ADMIN — HEPARIN SODIUM 7000 UNITS: 1000 INJECTION, SOLUTION INTRAVENOUS; SUBCUTANEOUS at 12:45

## 2019-10-23 RX ADMIN — LIDOCAINE HYDROCHLORIDE: 20 INJECTION, SOLUTION INFILTRATION; PERINEURAL at 11:53

## 2019-10-23 RX ADMIN — CLOPIDOGREL BISULFATE 600 MG: 300 TABLET, FILM COATED ORAL at 12:52

## 2019-10-23 RX ADMIN — CARVEDILOL 6.25 MG: 6.25 TABLET, FILM COATED ORAL at 17:44

## 2019-10-23 RX ADMIN — NITROGLYCERIN 10 ML: 20 INJECTION INTRAVENOUS at 11:53

## 2019-10-23 ASSESSMENT — ENCOUNTER SYMPTOMS
ABDOMINAL PAIN: 0
FALLS: 0
PALPITATIONS: 0
VOMITING: 0
FEVER: 0
SHORTNESS OF BREATH: 0
HEADACHES: 0
PSYCHIATRIC NEGATIVE: 1
NAUSEA: 0
LOSS OF CONSCIOUSNESS: 0
CHILLS: 0

## 2019-10-23 NOTE — PROGRESS NOTES
Tech from Lab called with critical result of .3 at 1830. Critical lab result read back to Tech. This critical lab result is within parameters established by heparin protocol.

## 2019-10-23 NOTE — CARE PLAN
Problem: Communication  Goal: The ability to communicate needs accurately and effectively will improve  Outcome: PROGRESSING AS EXPECTED     Problem: Safety  Goal: Will remain free from injury  Outcome: PROGRESSING AS EXPECTED     Problem: Knowledge Deficit  Goal: Knowledge of the prescribed therapeutic regimen will improve  Outcome: PROGRESSING AS EXPECTED

## 2019-10-23 NOTE — CARE PLAN
Problem: Communication  Goal: The ability to communicate needs accurately and effectively will improve  Outcome: PROGRESSING AS EXPECTED   Pt's whiteboard updated. Pt has been updated on POC. All questions have been answered.    Problem: Infection  Goal: Will remain free from infection  Outcome: PROGRESSING AS EXPECTED   Patient showing no s/s of infection

## 2019-10-23 NOTE — PROGRESS NOTES
[facility] Medicine Daily Progress Note    Date of Service  10/23/2019    Chief Complaint  85 y.o. male admitted 10/22/2019 with substernal chest pain that started the day prior to presentation.     Interval Problem Update  Feels improved but tired as he didn't sleep well. No overnight events. Plan for cath today. Denies any questions or concerns at this time.     Consultants/Specialty  Cardiology    Code Status  Full    Disposition  Pending cardiac cath results today.     Review of Systems  Review of Systems   Constitutional: Positive for malaise/fatigue. Negative for chills and fever.   Respiratory: Negative for shortness of breath.    Cardiovascular: Positive for chest pain (improving). Negative for palpitations and leg swelling.   Gastrointestinal: Negative for abdominal pain, nausea and vomiting.   Genitourinary: Negative for dysuria.   Musculoskeletal: Negative for falls.   Neurological: Negative for loss of consciousness and headaches.   Psychiatric/Behavioral: Negative.    All other systems reviewed and are negative.       Physical Exam  Temp:  [36.7 °C (98 °F)-37.2 °C (98.9 °F)] 36.7 °C (98 °F)  Pulse:  [] 90  Resp:  [14-19] 18  BP: ()/(65-94) 135/94  SpO2:  [91 %-94 %] 91 %    Physical Exam   Constitutional: He is oriented to person, place, and time. He appears well-developed. No distress.   HENT:   Head: Normocephalic.   Eyes: EOM are normal. No scleral icterus.   Neck: Normal range of motion. No tracheal deviation present.   Cardiovascular: Normal rate.   Pulmonary/Chest: Effort normal and breath sounds normal. No stridor. No respiratory distress. He has no rales.   Abdominal: Soft. He exhibits no distension. There is no tenderness.   Musculoskeletal: He exhibits no tenderness.   Neurological: He is alert and oriented to person, place, and time.   Skin: Skin is warm and dry.   Psychiatric: He has a normal mood and affect. His speech is normal and behavior is normal.   Vitals  reviewed.      Fluids    Intake/Output Summary (Last 24 hours) at 10/23/2019 0752  Last data filed at 10/22/2019 1600  Gross per 24 hour   Intake 300 ml   Output 200 ml   Net 100 ml       Laboratory  Recent Labs     10/23/19  0149   WBC 6.8   RBC 5.27   HEMOGLOBIN 17.0   HEMATOCRIT 51.4   MCV 97.5   MCH 32.3   MCHC 33.1*   RDW 47.3   PLATELETCT 143*   MPV 10.5     Recent Labs     10/23/19  0149   SODIUM 136  136   POTASSIUM 3.8  3.8   CHLORIDE 105  105   CO2 23  23   GLUCOSE 112*  112*   BUN 16  16   CREATININE 0.84  0.84   CALCIUM 8.5  8.5     Recent Labs     10/22/19  0252 10/22/19  0957 10/22/19  1743 10/23/19  0149   APTT 49.7* 92.3* 128.3* 92.8*   INR 1.12  --   --  1.08         Recent Labs     10/23/19  0149   TRIGLYCERIDE 129   HDL 31*   LDL 86       Imaging  CL-LEFT HEART CATHETERIZATION WITH POSSIBLE INTERVENTION   Final Result      EC-ECHOCARDIOGRAM COMPLETE W/ CONT   Final Result           Assessment/Plan  NSTEMI (non-ST elevated myocardial infarction) (HCC)- (present on admission)  Assessment & Plan  Substernal CP associated with SOB. TSH normal, A1C slightly elevated.   - cardiology following, appreciate recs  - started on statin, BB and ACEi  - s/p heparin drip  - plan for cath lab today and possible intervention  - monitor on telemetry    Atrial fibrillation (HCC)- (present on admission)  Assessment & Plan  - Started on Coreg and IV heparin  - echo pending  - will need anticoagulation post cath procedure    Elevated hemoglobin A1c- (present on admission)  Assessment & Plan  Will likely be able to control with diet. Given age and co-morbidities he is at goal. A1C mildly elevated at 6.8%.  - outpatient follow up  - monitor intermittently    Hyperlipidemia- (present on admission)  Assessment & Plan  LDL 86  - started on high intensity statin    Essential hypertension- (present on admission)  Assessment & Plan  Uncontrolled, labile  - tolerating coreg 6.25mg BID  - starting lisinopril 10mg daily,  with holding parameters  - IV hydralazine PRN for SBP greater than 160       VTE prophylaxis: held for cardiac cath

## 2019-10-23 NOTE — ASSESSMENT & PLAN NOTE
Will likely be able to control with diet. Given age and co-morbidities he is at goal. A1C mildly elevated at 6.8%.  - outpatient follow up  - monitor intermittently

## 2019-10-23 NOTE — PROGRESS NOTES
Bedside report received. Patient A&O x4. No complaints of pain. Heparin drip was stopped for one hour. Followed heparin protocol  And restarted with Jacob RN verifying rate change.      POC discussed with patient. Patient verbalized understanding. Call light and belongings within reach. Bed locked and in lowest position, alarm and fall precautions in place.

## 2019-10-23 NOTE — PROGRESS NOTES
Bedside report received from night RN, pt care assumed. Patient sleeping during this time. Bed in lowest, locked position, treaded socks on, call light and belongings within reach. Heparin gtt verified with night RN.

## 2019-10-24 VITALS
OXYGEN SATURATION: 100 % | RESPIRATION RATE: 16 BRPM | TEMPERATURE: 98.1 F | WEIGHT: 210.54 LBS | HEIGHT: 66 IN | BODY MASS INDEX: 33.84 KG/M2 | HEART RATE: 93 BPM | DIASTOLIC BLOOD PRESSURE: 74 MMHG | SYSTOLIC BLOOD PRESSURE: 112 MMHG

## 2019-10-24 PROBLEM — I21.4 NSTEMI (NON-ST ELEVATED MYOCARDIAL INFARCTION) (HCC): Status: RESOLVED | Noted: 2019-10-22 | Resolved: 2019-10-24

## 2019-10-24 LAB
ANION GAP SERPL CALC-SCNC: 10 MMOL/L (ref 0–11.9)
APTT PPP: 28.4 SEC (ref 24.7–36)
BUN SERPL-MCNC: 13 MG/DL (ref 8–22)
CALCIUM SERPL-MCNC: 8.4 MG/DL (ref 8.5–10.5)
CHLORIDE SERPL-SCNC: 105 MMOL/L (ref 96–112)
CO2 SERPL-SCNC: 20 MMOL/L (ref 20–33)
CREAT SERPL-MCNC: 0.67 MG/DL (ref 0.5–1.4)
ERYTHROCYTE [DISTWIDTH] IN BLOOD BY AUTOMATED COUNT: 46.7 FL (ref 35.9–50)
GLUCOSE SERPL-MCNC: 122 MG/DL (ref 65–99)
HCT VFR BLD AUTO: 49.7 % (ref 42–52)
HGB BLD-MCNC: 16.9 G/DL (ref 14–18)
MCH RBC QN AUTO: 33 PG (ref 27–33)
MCHC RBC AUTO-ENTMCNC: 34 G/DL (ref 33.7–35.3)
MCV RBC AUTO: 97.1 FL (ref 81.4–97.8)
PLATELET # BLD AUTO: 127 K/UL (ref 164–446)
PMV BLD AUTO: 10.6 FL (ref 9–12.9)
POTASSIUM SERPL-SCNC: 4.1 MMOL/L (ref 3.6–5.5)
RBC # BLD AUTO: 5.12 M/UL (ref 4.7–6.1)
SODIUM SERPL-SCNC: 135 MMOL/L (ref 135–145)
WBC # BLD AUTO: 7.3 K/UL (ref 4.8–10.8)

## 2019-10-24 PROCEDURE — 99232 SBSQ HOSP IP/OBS MODERATE 35: CPT | Performed by: INTERNAL MEDICINE

## 2019-10-24 PROCEDURE — 99239 HOSP IP/OBS DSCHRG MGMT >30: CPT | Performed by: HOSPITALIST

## 2019-10-24 PROCEDURE — 700102 HCHG RX REV CODE 250 W/ 637 OVERRIDE(OP): Performed by: INTERNAL MEDICINE

## 2019-10-24 PROCEDURE — 36415 COLL VENOUS BLD VENIPUNCTURE: CPT

## 2019-10-24 PROCEDURE — 97162 PT EVAL MOD COMPLEX 30 MIN: CPT

## 2019-10-24 PROCEDURE — 700102 HCHG RX REV CODE 250 W/ 637 OVERRIDE(OP): Performed by: HOSPITALIST

## 2019-10-24 PROCEDURE — 700102 HCHG RX REV CODE 250 W/ 637 OVERRIDE(OP): Performed by: PHYSICIAN ASSISTANT

## 2019-10-24 PROCEDURE — A9270 NON-COVERED ITEM OR SERVICE: HCPCS | Performed by: INTERNAL MEDICINE

## 2019-10-24 PROCEDURE — A9270 NON-COVERED ITEM OR SERVICE: HCPCS | Performed by: PHYSICIAN ASSISTANT

## 2019-10-24 PROCEDURE — 85730 THROMBOPLASTIN TIME PARTIAL: CPT

## 2019-10-24 PROCEDURE — 80048 BASIC METABOLIC PNL TOTAL CA: CPT

## 2019-10-24 PROCEDURE — 85027 COMPLETE CBC AUTOMATED: CPT

## 2019-10-24 PROCEDURE — A9270 NON-COVERED ITEM OR SERVICE: HCPCS | Performed by: HOSPITALIST

## 2019-10-24 PROCEDURE — 700111 HCHG RX REV CODE 636 W/ 250 OVERRIDE (IP): Performed by: PHYSICIAN ASSISTANT

## 2019-10-24 RX ORDER — CARVEDILOL 6.25 MG/1
6.25 TABLET ORAL ONCE
Status: COMPLETED | OUTPATIENT
Start: 2019-10-24 | End: 2019-10-24

## 2019-10-24 RX ORDER — CLOPIDOGREL BISULFATE 75 MG/1
75 TABLET ORAL DAILY
Qty: 30 TAB | Refills: 11
Start: 2019-10-25

## 2019-10-24 RX ORDER — ASPIRIN 81 MG/1
81 TABLET ORAL DAILY
Qty: 30 TAB | Refills: 0 | COMMUNITY
Start: 2019-10-25

## 2019-10-24 RX ORDER — LISINOPRIL 5 MG/1
5 TABLET ORAL DAILY
Qty: 30 TAB | Refills: 0 | Status: SHIPPED | OUTPATIENT
Start: 2019-10-25

## 2019-10-24 RX ORDER — LISINOPRIL 5 MG/1
5 TABLET ORAL
Status: DISCONTINUED | OUTPATIENT
Start: 2019-10-25 | End: 2019-10-24 | Stop reason: HOSPADM

## 2019-10-24 RX ORDER — CARVEDILOL 12.5 MG/1
12.5 TABLET ORAL 2 TIMES DAILY WITH MEALS
Status: DISCONTINUED | OUTPATIENT
Start: 2019-10-24 | End: 2019-10-24 | Stop reason: HOSPADM

## 2019-10-24 RX ORDER — CARVEDILOL 12.5 MG/1
12.5 TABLET ORAL 2 TIMES DAILY WITH MEALS
Qty: 60 TAB | Refills: 0 | Status: SHIPPED | OUTPATIENT
Start: 2019-10-24

## 2019-10-24 RX ORDER — CLOPIDOGREL BISULFATE 75 MG/1
75 TABLET ORAL DAILY
Qty: 30 TAB | Refills: 1 | Status: SHIPPED | OUTPATIENT
Start: 2019-10-25 | End: 2019-10-24

## 2019-10-24 RX ORDER — FUROSEMIDE 10 MG/ML
20 INJECTION INTRAMUSCULAR; INTRAVENOUS
Status: DISCONTINUED | OUTPATIENT
Start: 2019-10-24 | End: 2019-10-24

## 2019-10-24 RX ORDER — CLOPIDOGREL BISULFATE 75 MG/1
75 TABLET ORAL DAILY
Qty: 30 TAB | Refills: 1 | Status: SHIPPED | OUTPATIENT
Start: 2019-10-24 | End: 2019-10-24

## 2019-10-24 RX ORDER — ATORVASTATIN CALCIUM 80 MG/1
80 TABLET, FILM COATED ORAL EVERY EVENING
Qty: 30 TAB | Refills: 11 | Status: SHIPPED | OUTPATIENT
Start: 2019-10-24

## 2019-10-24 RX ORDER — CLOPIDOGREL BISULFATE 75 MG/1
75 TABLET ORAL DAILY
Qty: 30 TAB | Refills: 11 | Status: SHIPPED | OUTPATIENT
Start: 2019-10-25 | End: 2019-10-24

## 2019-10-24 RX ORDER — CLOPIDOGREL BISULFATE 75 MG/1
75 TABLET ORAL DAILY
Qty: 30 TAB | Refills: 0
Start: 2019-10-25 | End: 2019-10-24

## 2019-10-24 RX ADMIN — FUROSEMIDE 20 MG: 10 INJECTION, SOLUTION INTRAMUSCULAR; INTRAVENOUS at 10:40

## 2019-10-24 RX ADMIN — ASPIRIN 81 MG: 81 TABLET, COATED ORAL at 04:41

## 2019-10-24 RX ADMIN — CLOPIDOGREL BISULFATE 75 MG: 75 TABLET ORAL at 04:41

## 2019-10-24 RX ADMIN — LISINOPRIL 10 MG: 5 TABLET ORAL at 04:40

## 2019-10-24 RX ADMIN — CARVEDILOL 6.25 MG: 6.25 TABLET, FILM COATED ORAL at 04:40

## 2019-10-24 RX ADMIN — CARVEDILOL 6.25 MG: 6.25 TABLET, FILM COATED ORAL at 10:40

## 2019-10-24 ASSESSMENT — COGNITIVE AND FUNCTIONAL STATUS - GENERAL
SUGGESTED CMS G CODE MODIFIER MOBILITY: CK
MOVING TO AND FROM BED TO CHAIR: A LITTLE
CLIMB 3 TO 5 STEPS WITH RAILING: A LITTLE
MOBILITY SCORE: 19
MOVING FROM LYING ON BACK TO SITTING ON SIDE OF FLAT BED: A LITTLE
STANDING UP FROM CHAIR USING ARMS: A LITTLE
WALKING IN HOSPITAL ROOM: A LITTLE

## 2019-10-24 ASSESSMENT — GAIT ASSESSMENTS
DEVIATION: OTHER (COMMENT)
DISTANCE (FEET): 50
GAIT LEVEL OF ASSIST: SUPERVISED

## 2019-10-24 ASSESSMENT — ENCOUNTER SYMPTOMS
FEVER: 0
LIGHT-HEADEDNESS: 0
ABDOMINAL DISTENTION: 0
ROS SKIN COMMENTS: BRUISING
PALPITATIONS: 0
ABDOMINAL PAIN: 0
DIZZINESS: 0
BRUISES/BLEEDS EASILY: 1
SHORTNESS OF BREATH: 0
CHILLS: 0
FATIGUE: 1
COUGH: 0

## 2019-10-24 NOTE — DISCHARGE PLANNING
RN CM verified with [facility] pharmacy at [facility] that his Eliquis is ready for  and copay is $20. Patient agreeable to pay. Bedside RN aware.

## 2019-10-24 NOTE — THERAPY
"Physical Therapy Cardiac Rehab Evaluation completed.   Gait: Level Of Assist: Supervised with No Equipment Needed       Plan of Care: Patient with no further skilled PT needs in the acute care setting at this time  Discharge Recommendations: Equipment: No Equipment Needed. Post-acute therapy: Currently anticipate no further skilled therapy needs once patient is discharged from the inpatient setting.    See \"Rehab Therapy-Acute\" Patient Summary Report for complete documentation.    Patient is a pleasant 84 YO male that presented with complaints of chest pain, SOB, diaphoresis and is s/p NSTEMI with stent to distal LCX. EF 70% per echo. PMHx significant for HTN, HLD. He ambulated approximately 50ft before increased WOB and positive talk test. He required cueing for proper activity pacing but reported \"I would have stopped sooner if I was by myself, you're the one that wanted to walk.\" Provided patient education regarding cardiac risk factors, phase 1 cardiac rehab, home exercise program and appropriate activity progression, self monitoring via RPE scale/talk test/HR; patient with limited demonstration of understanding but reported he takes frequent breaks during activity. Given patient's limited demonstration of learning, age, and sedentary lifestyle, no plans to follow for further education. He is at safe functional level to return home.  "

## 2019-10-24 NOTE — PROGRESS NOTES
Cardiovascular Nurse Navigator ([phone]) Note:    NSTEMI with PCI on 10/23/19. Echo on 10/22/19 shows EF of 70%. No HF diagnosis.    Reviewed ACS medications:  · DAPT: aspirin + clopidogrel  · Beta-Blocker:  carvedilol  · Statin:  atorva 80  · Consider for aldosterone blockade?  no -- EF is 70%  · Consider for ACE-I/ARB/ARNI?  Is on lisinopril    Meds to Beds BEDSIDE NURSING RESPONSIBILITIES:    Please initiate Meds to Beds for dual antiplatelet therapy:     1. Obtain outpatient order for P2Y12 inhibitor (ticagrelor, clopidogrel, prasugrel) from physician   2. Call [phone] (or, if after hours/weekend, [phone] and request 'on-call anti-coag pharmacist') patient should have med in hand at time of discharge.    Intensive Cardiac Rehab (ICR) Referral:  Referred on 10/23/19; has current inpatient orders for nutrition consult & PT for Phase I ICR    Demographics  Patient resides in: [place], [place]  Insurance: Medicare and [employer]    Inpatient & Discharge Patient Education:  Bedside nursing to continually provide patient education on ACS meds, signs and symptoms to monitor for, and risk factor modification.     Also at discharge please complete the “Acute Coronary Syndrome” special instructions on the AVS.

## 2019-10-24 NOTE — DISCHARGE PLANNING
MEDS TO BEDS     DELIVERED TO PATIENT IN ROOM    Clopidogrel 75mg #30 provided at $4.37 copay.     Pt education provided, all questions answered, including but not limited to potential side effects, what to do if a dose is missed, when to contact his physician.  Pt verbalized understanding to take this medication daily A, with or without regard to food,  for a duration of 12 months unless otherwise directed by cardiology.  Pt understands to begin this medication AFTER discharge from hospital. Pt verbalized understanding that he must refill this medication at his local drug outlet.     All questions answered.  Pt verbalized understanding including when to return to the ED.  Braden Freeman, PharmD

## 2019-10-24 NOTE — CARE PLAN
Problem: Communication  Goal: The ability to communicate needs accurately and effectively will improve  Outcome: PROGRESSING AS EXPECTED  Intervention: [place] patient and significant other/support system to call light to alert staff of needs  Note:   Pt. Is oriented to call light system and calls when assistance is needed. Pt. Updated on plan of care. Pt. Encouraged to communicate any needs or concerns.      Problem: Knowledge Deficit  Goal: Knowledge of disease process/condition, treatment plan, diagnostic tests, and medications will improve  Outcome: PROGRESSING AS EXPECTED  Intervention: Assess knowledge level of disease process/condition, treatment plan, diagnostic tests, and medications  Note:   Educated pt on plan of care for the day, treatment, medications and diagnosis.

## 2019-10-24 NOTE — PROGRESS NOTES
Cardiology Follow Up Progress Note    Date of Service  10/24/2019    Attending Physician  Renetta Apodaca M.D.    Chief Complaint   Chest pain    HPI  Melissa Rust is a 85 y.o. male admitted 10/22/2019 with NSTEMI from outside hospital. He was atrial fibrillation on arrival which is a new diagnosis. Troponin peak at 243ng/L.     Interim Events  No overnight events.  He is anxious to leave home today.  Denies any more chest pain, shortness of breath.  He does have a blood pressure cuff at home which he checks his blood pressure regularly.  Prior to hospitalization he took nadolol and Norvasc, his blood pressure is well controlled in the 120s.  He sees a cardiologist in [place] which is much closer to him in [place].    Review of Systems  Review of Systems   Constitutional: Positive for fatigue. Negative for chills and fever.   Respiratory: Negative for cough and shortness of breath.    Cardiovascular: Negative for chest pain, palpitations and leg swelling.   Gastrointestinal: Negative for abdominal distention and abdominal pain.   Genitourinary: Negative for difficulty urinating.   Musculoskeletal: Negative for gait problem.   Skin:        Bruising   Neurological: Negative for dizziness and light-headedness.   Hematological: Bruises/bleeds easily.       Vital signs in last 24 hours  Temp:  [36.3 °C (97.4 °F)-37 °C (98.6 °F)] 36.9 °C (98.5 °F)  Pulse:  [] 87  Resp:  [14-18] 18  BP: (110-166)/() 135/94  SpO2:  [93 %-97 %] 95 %    Physical Exam  Physical Exam   Constitutional: He is oriented to person, place, and time. He appears well-developed and well-nourished. No distress.   HENT:   Head: Normocephalic and atraumatic.   Mouth/Throat: Oropharynx is clear and moist.   Eyes: Conjunctivae are normal. No scleral icterus.   Neck: Neck supple. No JVD present.   Cardiovascular: Normal rate and regular rhythm.   No murmur heard.  Pulses:       Radial pulses are 2+ on the right side, and 2+ on the left  side.        Dorsalis pedis pulses are 2+ on the right side, and 2+ on the left side.   Pulmonary/Chest: Effort normal. No respiratory distress. He has no rales.   Weak inspiratory effort   Abdominal: Soft. Bowel sounds are normal. He exhibits no distension. There is no tenderness.   Musculoskeletal: He exhibits no edema.   Right wrist is soft, normal movement and sensation in right hand.  Ecchymosis over right wrist and forearm.   Neurological: He is alert and oriented to person, place, and time. No cranial nerve deficit.   Skin: Skin is warm and dry. No pallor.   Psychiatric: Judgment normal.   Vitals reviewed.      Lab Review  Lab Results   Component Value Date/Time    WBC 7.3 10/24/2019 02:19 AM    RBC 5.12 10/24/2019 02:19 AM    HEMOGLOBIN 16.9 10/24/2019 02:19 AM    HEMATOCRIT 49.7 10/24/2019 02:19 AM    MCV 97.1 10/24/2019 02:19 AM    MCH 33.0 10/24/2019 02:19 AM    MCHC 34.0 10/24/2019 02:19 AM    MPV 10.6 10/24/2019 02:19 AM      Lab Results   Component Value Date/Time    SODIUM 135 10/24/2019 02:19 AM    POTASSIUM 4.1 10/24/2019 02:19 AM    CHLORIDE 105 10/24/2019 02:19 AM    CO2 20 10/24/2019 02:19 AM    GLUCOSE 122 (H) 10/24/2019 02:19 AM    BUN 13 10/24/2019 02:19 AM    CREATININE 0.67 10/24/2019 02:19 AM      Lab Results   Component Value Date/Time    ASTSGOT 24 10/23/2019 01:49 AM    ALTSGPT 12 10/23/2019 01:49 AM     Lab Results   Component Value Date/Time    CHOLSTRLTOT 143 10/23/2019 01:49 AM    LDL 86 10/23/2019 01:49 AM    HDL 31 (A) 10/23/2019 01:49 AM    TRIGLYCERIDE 129 10/23/2019 01:49 AM    TROPONINT 243 (H) 10/22/2019 06:09 AM       No results for input(s): NTPROBNP in the last 72 hours.    Cardiac Imaging and Procedures Review    Echocardiogram:  10/22/19  No prior study is available for comparison.   Technically difficult but adequate study for interpretation.   Contrast was used to enhance definition of the endocardial borders.   Normal left ventricular chamber size.  Left ventricular  ejection fraction is visually estimated to be 70%.  Normal regional wall motion.  The aortic valve is not well visualized.  Mild aortic stenosis. Transvalvular gradients are - Peak: 16 mmHg,   Mean: 10 mmHg.  Mild aortic insufficiency.  No mitral stenosis or regurgitation seen.  The tricuspid valve is not well visualized.  Unable to estimate pulmonary artery pressure due to an inadequate   tricuspid regurgitant jet.  Normal right ventricular size and systolic function.  Normal pericardium without effusion.    Cardiac Catheterization:  10/23/19  Conclusions    1. NSTEMI due to severe circumflex-OM2 stenosis.    2. Residual moderate to severe stenosis of the proximal LAD and ostial OM1.    3. Successful PCI of the mid-circumflex/OM1 with VINICIUS.    4. Successful angioplasty of the distal circumflex which was jailed by the  stent.    5. Moderate elevation in LVEDP (23)      Assessment/Plan  NSTEMI s/p VINICIUS to mid circumflex/OM1  CAD   - aspirin 81mg and Plavix 75mg. Continue DAPT and OAC for 1 month, then DISCONTINUE ASPIRIN AND CONTINUE PLAVIX AND OAC  - I will arrange for med to bed delivery of plavix and send a year RX to his pharmacy in [place].  - lipitor 80mg  - coreg 12.5mg bid  - cardiac rehab phase I in the hospital.    Atrial Fibrillation, paroxysmal   -  [id number] 4  - due to elevated stroke risk and no prior history of bleeding, recommend Eliquis 5mg bid for stroke prevention  - increase coreg to 12.5mg bid, monitor HR and BP at home, may need to decrease    HTN  - coreg and lisinopril  - DC nadolol and norvasc  - monitor BP at home, review with his cardiologist at next appointment    Elevated LVEDP  - lasix 20mg IV x1    No further recommendations. Appropriate for discharge.   He wishes to follow up with his Cardiologist in [place], I recommend he make an appointment in 4 weeks from discharge.   Please send your discharge summary to his outside cardiologist.   Staffed with Dr. Tinajero.

## 2019-10-24 NOTE — DISCHARGE PLANNING
Care Transition Team Assessment     RN JD met with patient at bedside. Patient lives with son in a mobile home in [place] with his son. He is independent with all ADL's and IADL's and drives self to appointments. Patient uses no DME at baseline.    Son will be ride home upon discharge. Anticipate no needs at discharge.    Information Source  Orientation : Oriented x 4  Information Given By: Patient  Who is responsible for making decisions for patient? : Patient    Readmission Evaluation  Is this a readmission?: No    Elopement Risk  Legal Hold: No  Ambulatory or Self Mobile in Wheelchair: Yes  Disoriented: No  Psychiatric Symptoms: None  History of Wandering: No  Elopement this Admit: No  Vocalizing Wanting to Leave: No  Displays Behaviors, Body Language Wanting to Leave: No-Not at Risk for Elopement  Elopement Risk: Not at Risk for Elopement    Interdisciplinary Discharge Planning  Does Admitting Nurse Feel This Could be a Complex Discharge?: No  Primary Care Physician: Demetrius Antonio  Lives with - Patient's Self Care Capacity: Adult Children  Patient or legal guardian wants to designate a caregiver (see row info): No  Support Systems: Children  Housing / Facility: Mobile Home  Do You Take your Prescribed Medications Regularly: Yes  Able to Return to Previous ADL's: Yes  Mobility Issues: No  Prior Services: None  Patient Expects to be Discharged to:: home  Assistance Needed: No    Discharge Preparedness  What is your plan after discharge?: Home with help  What are your discharge supports?: Child  Prior Functional Level: Ambulatory, Drives Self, Independent with Activities of Daily Living, Independent with Medication Management  Difficulity with ADLs: None  Difficulity with IADLs: None    Functional Assesment  Prior Functional Level: Ambulatory, Drives Self, Independent with Activities of Daily Living, Independent with Medication Management    Finances  Financial Barriers to Discharge: No  Prescription Coverage:  Yes    Vision / Hearing Impairment  Vision Impairment : Yes  Right Eye Vision: Impaired, Wears Glasses  Left Eye Vision: Impaired, Wears Glasses  Hearing Impairment : Yes  Hearing Impairment: Both Ears, Patient Declines to Wear Hearing Device(s)  Does Pt Need Special Equipment for the Hearing Impaired?: No    Domestic Abuse  Have you ever been the victim of abuse or violence?: No  Physical Abuse or Sexual Abuse: No  Verbal Abuse or Emotional Abuse: No  Possible Abuse Reported to:: Not Applicable    Discharge Risks or Barriers  Discharge risks or barriers?: No    Anticipated Discharge Information  Anticipated discharge disposition: Home  Discharge Address: [place] [address]  Discharge Contact Phone Number: [phone]

## 2019-10-24 NOTE — DISCHARGE SUMMARY
Discharge Summary    CHIEF COMPLAINT ON ADMISSION  No chief complaint on file.      Reason for Admission  NSTEMI     Admission Date  10/22/2019    CODE STATUS  Full Code    HPI & HOSPITAL COURSE  This is a 85 y.o. male here with substernal CP with associated SOB that started the day before presentation.  EKG showed he was in Afib, rate controlled. He was started on a heparin drip and transferred to telemetry at [facility] for specialty consultation. He went for cardiac cath and had successful PCI to the mid circumflex/OM1. He was started on ASA, statin, plavix, coreg, and lisinopril. He has an elevated [id number] of 4 so he is to continue with triple therapy x30 days then d/c the ASA. Pre auth was sent for eliquis and it was not cost prohibitive. He felt improved and was eager for discharge.       Therefore, he is discharged in good and stable condition to home with close outpatient follow-up.    The patient met 2-midnight criteria for an inpatient stay at the time of discharge.    Discharge Date  10/24/2019    FOLLOW UP ITEMS POST DISCHARGE  Please follow up with your PCP and Cardiology.    DISCHARGE DIAGNOSES  Active Problems:    Atrial fibrillation (HCC) POA: Yes    Essential hypertension POA: Yes    Hyperlipidemia POA: Yes    Elevated hemoglobin A1c POA: Yes  Resolved Problems:    NSTEMI (non-ST elevated myocardial infarction) (HCC) POA: Yes      FOLLOW UP  Demetrius Antonio N.P.  [address] [place] [id number]  [phone]          [facility]  [address]  [address]  [facility] [address] [phone]  Call in 1 week  Your physician has referred you to Intensive Cardiac Rehab which is very important for your recovery. Please speak with your physician in your home town about a local cardiac rehab program that you can attend. Thank you.      MEDICATIONS ON DISCHARGE     Medication List      START taking these medications      Instructions   apixaban 5mg Tabs  Commonly known  as:  ELIQUIS   Take 1 Tab by mouth 2 Times a Day.  Dose:  5 mg     aspirin 81 MG EC tablet  Start taking on:  10/25/2019  Replaces:  aspirin 325 MG Tabs   Doctor's comments:  30 days of aspirin then STOP and continue plavix and oral anticoagulation  Take 1 Tab by mouth every day.  Dose:  81 mg     atorvastatin 80 MG tablet  Commonly known as:  LIPITOR   Take 1 Tab by mouth every evening.  Dose:  80 mg     carvedilol 12.5 MG Tabs  Commonly known as:  COREG   Take 1 Tab by mouth 2 times a day, with meals.  Dose:  12.5 mg     clopidogrel 75 MG Tabs  Start taking on:  10/25/2019  Commonly known as:  PLAVIX   Take 1 Tab by mouth every day.  Dose:  75 mg     lisinopril 5 MG Tabs  Start taking on:  10/25/2019  Commonly known as:  PRINIVIL   Take 1 Tab by mouth every day.  Dose:  5 mg        CONTINUE taking these medications      Instructions   TYLENOL 8 HOUR 650 MG CR tablet  Generic drug:  acetaminophen   Take 650 mg by mouth every 6 hours as needed.  Dose:  650 mg        STOP taking these medications    amLODIPine 2.5 MG Tabs  Commonly known as:  NORVASC     aspirin 325 MG Tabs  Commonly known as:  ASA  Replaced by:  aspirin 81 MG EC tablet     nadolol 80 MG tablet  Commonly known as:  CORGARD            Allergies  Allergies   Allergen Reactions   • Juniper Tar Swelling       DIET  Orders Placed This Encounter   Procedures   • Diet Order Cardiac     Standing Status:   Standing     Number of Occurrences:   1     Order Specific Question:   Diet:     Answer:   Cardiac [6]       ACTIVITY  As tolerated.  Weight bearing as tolerated    CONSULTATIONS  Cardiology    PROCEDURES  CL-LEFT HEART CATHETERIZATION WITH POSSIBLE INTERVENTION   Final Result      EC-ECHOCARDIOGRAM COMPLETE W/ CONT   Final Result            LABORATORY  Lab Results   Component Value Date    SODIUM 135 10/24/2019    POTASSIUM 4.1 10/24/2019    CHLORIDE 105 10/24/2019    CO2 20 10/24/2019    GLUCOSE 122 (H) 10/24/2019    BUN 13 10/24/2019    CREATININE 0.67  10/24/2019        Lab Results   Component Value Date    WBC 7.3 10/24/2019    HEMOGLOBIN 16.9 10/24/2019    HEMATOCRIT 49.7 10/24/2019    PLATELETCT 127 (L) 10/24/2019        Total time of the discharge process exceeds 36 minutes.

## 2019-10-24 NOTE — DISCHARGE INSTRUCTIONS
Discharge Instructions    Take aspirin 81mg and plavix 75mg once a day for thirty days (with your blood thinner). After 30 days (11/23/19) STOP Aspirin and continue to take plavix 75mg once a day with your blood thinner.    Take your blood pressure at home. If you have low blood pressure (less than 100/60) you can STOP taking Lisinopril    Follow up with your cardiologist in [place] in about 4 weeks after your hospitalization.     Discharged to home by car with relative. Discharged via wheelchair, hospital escort: Yes.  Special equipment needed: Not Applicable    Be sure to schedule a follow-up appointment with your primary care doctor or any specialists as instructed.     Discharge Plan:   Influenza Vaccine Indication: Patient Refuses    I understand that a diet low in cholesterol, fat, and sodium is recommended for good health. Unless I have been given specific instructions below for another diet, I accept this instruction as my diet prescription.   Other diet: Cardiac Diet     Special Instructions: Diagnosis:  Acute Coronary Syndrome (ACS) is a diagnosis that encompasses cardiac-related chest pain and heart attack. ACS occurs when the blood flow to the heart muscle is severely reduced or cut off completely due to a slow process called atherosclerosis.  Atherosclerosis is a disease in which the coronary arteries become narrow from a buildup of fat, cholesterol, and other substances that combine to form plaque. If the plaque breaks, a blood clot will form and block the blood flow to the heart muscle. This lack of blood flow can cause damage or death to the heart muscle which is called a heart attack or Myocardial Infarction (MI). There are two different types of MIs:  ST Elevation Myocardial Infarction or STEMI (the most severe type of heart attack) and Non-ST Elevation Myocardial Infarction or NSTEMI.    Treatment Plan:  · Cardiac Diet  - Low fat, low salt, low cholesterol   · Cardiac Rehab  - Your doctor  has ordered you a referral to [facility].  Call [phone] to schedule an appointment.  · Attend my follow-up appointment with my Cardiologist.  · Take my medications as prescribed by my doctor  · Exercise daily  Quit Smoking, Lower my bad cholesterol and raise my good cholesterol, lower my blood pressure, and Reduce stress.    Medications:  Certain medications are used to treat ACS.  Remember to always take medications as prescribed and never stop talking medications unless told by your doctor.    You have been prescribed the following medicatons:    Aspirin - Aspirin is used as a blood thinning medication and you will require this medication indefinitely.  Anti-platelet/blood thinner - Your Anti-platelet/Blood thinning medication is called Plavix, and is used in combination with aspirin to prevent clots from forming in your heart and/or around your stent.  Your doctor will determine how long you need to be on this medicine.  Beta-Blocker - Beta-Blocker Coreg is used to lower blood pressure and heart rate, and/or helps your heart heal after a heart attack.  Statin - Statin Lipitor is used to lower cholesterol.    · Is patient discharged on Warfarin / Coumadin?   No       Coronary Angiogram With Stent  Coronary angiogram with stent placement is a procedure to widen or open a narrow blood vessel of the heart (coronary artery). Arteries may become blocked by cholesterol buildup (plaques) in the lining or wall. When a coronary artery becomes partially blocked, blood flow to that area decreases. This may lead to chest pain or a heart attack (myocardial infarction).  A stent is a small piece of metal that looks like mesh or a spring. Stent placement may be done as treatment for a heart attack or right after a coronary angiogram in which a blocked artery is found.  Let your health care provider know about:  · Any allergies you have.  · All medicines you are taking, including vitamins, herbs, eye drops, creams, and  over-the-counter medicines.  · Any problems you or family members have had with anesthetic medicines.  · Any blood disorders you have.  · Any surgeries you have had.  · Any medical conditions you have.  · Whether you are pregnant or may be pregnant.  What are the risks?  Generally, this is a safe procedure. However, problems may occur, including:  · Damage to the heart or its blood vessels.  · A return of blockage.  · Bleeding, infection, or bruising at the insertion site.  · A collection of blood under the skin (hematoma) at the insertion site.  · A blood clot in another part of the body.  · Kidney injury.  · Allergic reaction to the dye or contrast that is used.  · Bleeding into the abdomen (retroperitoneal bleeding).  What happens before the procedure?  Staying hydrated   Follow instructions from your health care provider about hydration, which may include:  · Up to 2 hours before the procedure - you may continue to drink clear liquids, such as water, clear fruit juice, black coffee, and plain tea.  Eating and drinking restrictions   Follow instructions from your health care provider about eating and drinking, which may include:  · 8 hours before the procedure - stop eating heavy meals or foods such as meat, fried foods, or fatty foods.  · 6 hours before the procedure - stop eating light meals or foods, such as toast or cereal.  · 2 hours before the procedure - stop drinking clear liquids.  Ask your health care provider about:  · Changing or stopping your regular medicines. This is especially important if you are taking diabetes medicines or blood thinners.  · Taking medicines such as ibuprofen. These medicines can thin your blood. Do not take these medicines before your procedure if your health care provider instructs you not to. Generally, aspirin is recommended before a procedure of passing a small, thin tube (catheter) through a blood vessel and into the heart (cardiac catheterization).  What happens during  the procedure?  · An IV tube will be inserted into one of your veins.  · You will be given one or more of the following:  ¨ A medicine to help you relax (sedative).  ¨ A medicine to numb the area where the catheter will be inserted into an artery (local anesthetic).  · To reduce your risk of infection:  ¨ Your health care team will wash or sanitize their hands.  ¨ Your skin will be washed with soap.  ¨ Hair may be removed from the area where the catheter will be inserted.  · Using a guide wire, the catheter will be inserted into an artery. The location may be in your groin, in your wrist, or in the fold of your arm (near your elbow).  · A type of X-ray (fluoroscopy) will be used to help guide the catheter to the opening of the arteries in the heart.  · A dye will be injected into the catheter, and X-rays will be taken. The dye will help to show where any narrowing or blockages are located in the arteries.  · A tiny wire will be guided to the blocked spot, and a balloon will be inflated to make the artery wider.  · The stent will be expanded and will crush the plaques into the wall of the vessel. The stent will hold the area open and improve the blood flow. Most stents have a drug coating to reduce the risk of the stent narrowing over time.  · The artery may be made wider using a drill, laser, or other tools to remove plaques.  · When the blood flow is better, the catheter will be removed. The lining of the artery will grow over the stent, which stays where it was placed.  This procedure may vary among health care providers and hospitals.  What happens after the procedure?  · If the procedure is done through the leg, you will be kept in bed lying flat for about 6 hours. You will be instructed to not bend and not cross your legs.  · The insertion site will be checked frequently.  · The pulse in your foot or wrist will be checked frequently.  · You may have additional blood tests, X-rays, and a test that records the  electrical activity of your heart (electrocardiogram, or ECG).  This information is not intended to replace advice given to you by your health care provider. Make sure you discuss any questions you have with your health care provider.  Document Released: 06/23/2004 Document Revised: 08/17/2017 Document Reviewed: 07/23/2017  [employer] Interactive Patient Education © 2017 [employer]    Angiogram, Care After  These instructions give you information about caring for yourself after your procedure. Your doctor may also give you more specific instructions. Call your doctor if you have any problems or questions after your procedure.   HOME CARE  Take medicines only as told by your doctor.  Follow your doctor's instructions about:  Care of the area where the tube was inserted.  Bandage (dressing) changes and removal.  You may shower 24-48 hours after the procedure or as told by your doctor.  Do not take baths, swim, or use a hot tub until your doctor approves.  Every day, check the area where the tube was inserted. Watch for:  Redness, swelling, or pain.  Fluid, blood, or pus.  Do not apply powder or lotion to the site.  Do not lift anything that is heavier than 10 lb (4.5 kg) for 5 days or as told by your doctor.  Ask your doctor when you can:  Return to work or school.  Do physical activities or play sports.  Have sex.  Do not drive or operate heavy machinery for 24 hours or as told by your doctor.  Have someone with you for the first 24 hours after the procedure.  Keep all follow-up visits as told by your doctor. This is important.  GET HELP IF:  You have a fever.    You have chills.    You have more bleeding from the area where the tube was inserted. Hold pressure on the area.  You have redness, swelling, or pain in the area where the tube was inserted.  You have fluid or pus coming from the area.  GET HELP RIGHT AWAY IF:   You have a lot of pain in the area where the tube was inserted.  The area where the tube was  "inserted is bleeding, and the bleeding does not stop after 30 minutes of holding steady pressure on the area.  The area near or just beyond the insertion site becomes pale, cool, tingly, or numb.     This information is not intended to replace advice given to you by your health care provider. Make sure you discuss any questions you have with your health care provider.     Document Released: 03/16/2010 Document Revised: 01/08/2016 Document Reviewed: 07/06/2015  [employer] Interactive Patient Education ©2016 [employer].    2 Gram Low Sodium Diet  A 2 gram sodium diet restricts the amount of sodium in the diet to no more than 2 g or 2000 mg daily. Limiting the amount of sodium is often used to help lower blood pressure. It is important if you have heart, liver, or kidney problems. Many foods contain sodium for flavor and sometimes as a preservative. When the amount of sodium in a diet needs to be low, it is important to know what to look for when choosing foods and drinks. The following includes some information and guidelines to help make it easier for you to adapt to a low sodium diet.  QUICK TIPS  · Do not add salt to food.  · Avoid convenience items and fast food.  · Choose unsalted snack foods.  · Buy lower sodium products, often labeled as \"lower sodium\" or \"no salt added.\"  · Check food labels to learn how much sodium is in 1 serving.  · When eating at a restaurant, ask that your food be prepared with less salt or none, if possible.  READING FOOD LABELS FOR SODIUM INFORMATION  The nutrition facts label is a good place to find how much sodium is in foods. Look for products with no more than 500 to 600 mg of sodium per meal and no more than 150 mg per serving.  Remember that 2 g = 2000 mg.  The food label may also list foods as:  · Sodium-free: Less than 5 mg in a serving.  · Very low sodium: 35 mg or less in a serving.  · Low-sodium: 140 mg or less in a serving.  · Light in sodium: 50% less sodium in a serving. " For example, if a food that usually has 300 mg of sodium is changed to become light in sodium, it will have 150 mg of sodium.  · Reduced sodium: 25% less sodium in a serving. For example, if a food that usually has 400 mg of sodium is changed to reduced sodium, it will have 300 mg of sodium.  CHOOSING FOODS  Grains  · Avoid: Salted crackers and snack items. Some cereals, including instant hot cereals. Bread stuffing and biscuit mixes. Seasoned rice or pasta mixes.  · Choose: Unsalted snack items. Low-sodium cereals, oats, puffed wheat and rice, shredded wheat. English muffins and bread. Pasta.  Meats  · Avoid: Salted, canned, smoked, spiced, pickled meats, including fish and poultry. Zheng, ham, sausage, cold cuts, hot dogs, anchovies.  · Choose: Low-sodium canned tuna and salmon. Fresh or frozen meat, poultry, and fish.  Dairy  · Avoid: Processed cheese and spreads. Cottage cheese. Buttermilk and condensed milk. Regular cheese.  · Choose: Milk. Low-sodium cottage cheese. Yogurt. Sour cream. Low-sodium cheese.  Fruits and Vegetables  · Avoid: Regular canned vegetables. Regular canned tomato sauce and paste. Frozen vegetables in sauces. Olives. Pickles. Relishes. Sauerkraut.  · Choose: Low-sodium canned vegetables. Low-sodium tomato sauce and paste. Frozen or fresh vegetables. Fresh and frozen fruit.  Condiments  · Avoid: Canned and packaged gravies. Worcestershire sauce. Tartar sauce. Barbecue sauce. Soy sauce. Steak sauce. Ketchup. Onion, garlic, and table salt. Meat flavorings and tenderizers.  · Choose: Fresh and dried herbs and spices. Low-sodium varieties of mustard and ketchup. Lemon juice. Tabasco sauce. Horseradish.  SAMPLE 2 GRAM SODIUM MEAL PLAN  Breakfast / Sodium (mg)  · 1 cup low-fat milk / 143 mg  · 2 slices whole-wheat toast / 270 mg  · 1 tbs heart-healthy margarine / 153 mg  · 1 hard-boiled egg / 139 mg  · 1 small orange / 0 mg  Lunch / Sodium (mg)  · 1 cup raw carrots / 76 mg  · ½ cup hummus / 298  mg  · 1 cup low-fat milk / 143 mg  · ½ cup red grapes / 2 mg  · 1 whole-wheat lucille bread / 356 mg  Dinner / Sodium (mg)  · 1 cup whole-wheat pasta / 2 mg  · 1 cup low-sodium tomato sauce / 73 mg  · 3 oz lean ground beef / 57 mg  · 1 small side salad (1 cup raw spinach leaves, ½ cup cucumber, ¼ cup yellow bell pepper) with 1 tsp olive oil and 1 tsp red wine vinegar / 25 mg  Snack / Sodium (mg)  · 1 container low-fat vanilla yogurt / 107 mg  · 3 jerod cracker squares / 127 mg  Nutrient Analysis  · Calories: 2033  · Protein: 77 g  · Carbohydrate: 282 g  · Fat: 72 g  · Sodium: 1971 mg  Document Released: 12/18/2006 Document Revised: 03/11/2013 Document Reviewed: 03/21/2011  [employer]® Patient Information ©2014 [employer].    Clopidogrel tablets  What is this medicine?  CLOPIDOGREL (kloh PID oh grel) helps to prevent blood clots. This medicine is used to prevent heart attack, stroke, or other vascular events in people who are at high risk.  This medicine may be used for other purposes; ask your health care provider or pharmacist if you have questions.  COMMON BRAND NAME(S): Plavix  What should I tell my health care provider before I take this medicine?  They need to know if you have any of the following conditions:  -bleeding disorders  -bleeding in the brain  -having surgery  -history of stomach bleeding  -an unusual or allergic reaction to clopidogrel, other medicines, foods, dyes, or preservatives  -pregnant or trying to get pregnant  -breast-feeding  How should I use this medicine?  Take this medicine by mouth with a glass of water. Follow the directions on the prescription label. You may take this medicine with or without food. If it upsets your stomach, take it with food. Take your medicine at regular intervals. Do not take it more often than directed. Do not stop taking except on your doctor's advice.  A special MedGuide will be given to you by the pharmacist with each prescription and refill. Be sure to read  this information carefully each time.  Talk to your pediatrician regarding the use of this medicine in children. Special care may be needed.  Overdosage: If you think you have taken too much of this medicine contact a poison control center or emergency room at once.  NOTE: This medicine is only for you. Do not share this medicine with others.  What if I miss a dose?  If you miss a dose, take it as soon as you can. If it is almost time for your next dose, take only that dose. Do not take double or extra doses.  What may interact with this medicine?  Do not take this medicine with the following medications:  -dasabuvir; ombitasvir; paritaprevir; ritonavir  -defibrotide  This medicine may also interact with the following medications:  -antiviral medicines for HIV or AIDS  -aspirin  -certain medicines for depression like citalopram, fluoxetine, fluvoxamine  -certain medicines for fungal infections like ketoconazole, fluconazole, voriconazole  -certain medicines for seizures like felbamate, oxcarbazepine, phenytoin  -certain medicines for stomach problems like cimetidine, omeprazole, esomeprazole  -certain medicines that treat or prevent blood clots like warfarin, enoxaparin, dalteparin, apixaban, dabigatran, rivaroxaban, ticlopidine  -chloramphenicol  -cilostazol  -fluvastatin  -isoniazid  -modafinil  -nicardipine  -NSAIDS, medicines for pain and inflammation, like ibuprofen or naproxen  -quinine  -repaglinide  -tamoxifen  -tolbutamide  -topiramate  -torsemide  This list may not describe all possible interactions. Give your health care provider a list of all the medicines, herbs, non-prescription drugs, or dietary supplements you use. Also tell them if you smoke, drink alcohol, or use illegal drugs. Some items may interact with your medicine.  What should I watch for while using this medicine?  Visit your doctor or health care professional for regular check ups. Do not stop taking your medicine unless your doctor tells  you to.  Notify your doctor or health care professional and seek emergency treatment if you develop breathing problems; changes in vision; chest pain; severe, sudden headache; pain, swelling, warmth in the leg; trouble speaking; sudden numbness or weakness of the face, arm or leg. These can be signs that your condition has gotten worse.  If you are going to have surgery or dental work, tell your doctor or health care professional that you are taking this medicine.  Certain genetic factors may reduce the effect of this medicine. Your doctor may use genetic tests to determine treatment.  What side effects may I notice from receiving this medicine?  Side effects that you should report to your doctor or health care professional as soon as possible:  -allergic reactions like skin rash, itching or hives, swelling of the face, lips, or tongue  -signs and symptoms of bleeding such as bloody or black, tarry stools; red or dark-brown urine; spitting up blood or brown material that looks like coffee grounds; red spots on the skin; unusual bruising or bleeding from the eye, gums, or nose  -signs and symptoms of a blood clot such as breathing problems; changes in vision; chest pain; severe, sudden headache; pain, swelling, warmth in the leg; trouble speaking; sudden numbness or weakness of the face, arm or leg  Side effects that usually do not require medical attention (report to your doctor or health care professional if they continue or are bothersome):  -constipation  -diarrhea  -headache  -upset stomach  This list may not describe all possible side effects. Call your doctor for medical advice about side effects. You may report side effects to FDA at 7-195-FDA-2692.  Where should I keep my medicine?  Keep out of the reach of children.  Store at room temperature of 59 to 86 degrees F (15 to 30 degrees C). Throw away any unused medicine after the expiration date.  NOTE: This sheet is a summary. It may not cover all possible  information. If you have questions about this medicine, talk to your doctor, pharmacist, or health care provider.  © 2018 [employer] (2016-09-22 10:00:44)    Apixaban oral tablets  What is this medicine?  APIXABAN (a PIX a ban) is an anticoagulant (blood thinner). It is used to lower the chance of stroke in people with a medical condition called atrial fibrillation. It is also used to treat or prevent blood clots in the lungs or in the veins.  This medicine may be used for other purposes; ask your health care provider or pharmacist if you have questions.  COMMON BRAND NAME(S): Eliquis  What should I tell my health care provider before I take this medicine?  They need to know if you have any of these conditions:  -bleeding disorders  -bleeding in the brain  -blood in your stools (black or tarry stools) or if you have blood in your vomit  -history of stomach bleeding  -kidney disease  -liver disease  -mechanical heart valve  -an unusual or allergic reaction to apixaban, other medicines, foods, dyes, or preservatives  -pregnant or trying to get pregnant  -breast-feeding  How should I use this medicine?  Take this medicine by mouth with a glass of water. Follow the directions on the prescription label. You can take it with or without food. If it upsets your stomach, take it with food. Take your medicine at regular intervals. Do not take it more often than directed. Do not stop taking except on your doctor's advice. Stopping this medicine may increase your risk of a blot clot. Be sure to refill your prescription before you run out of medicine.  Talk to your pediatrician regarding the use of this medicine in children. Special care may be needed.  Overdosage: If you think you have taken too much of this medicine contact a poison control center or emergency room at once.  NOTE: This medicine is only for you. Do not share this medicine with others.  What if I miss a dose?  If you miss a dose, take it as soon as  you can. If it is almost time for your next dose, take only that dose. Do not take double or extra doses.  What may interact with this medicine?  This medicine may interact with the following:  -aspirin and aspirin-like medicines  -certain medicines for fungal infections like ketoconazole and itraconazole  -certain medicines for seizures like carbamazepine and phenytoin  -certain medicines that treat or prevent blood clots like warfarin, enoxaparin, and dalteparin  -clarithromycin  -NSAIDs, medicines for pain and inflammation, like ibuprofen or naproxen  -rifampin  -ritonavir  -[place]'s wort  This list may not describe all possible interactions. Give your health care provider a list of all the medicines, herbs, non-prescription drugs, or dietary supplements you use. Also tell them if you smoke, drink alcohol, or use illegal drugs. Some items may interact with your medicine.  What should I watch for while using this medicine?  Visit your doctor or health care professional for regular checks on your progress.  Notify your doctor or health care professional and seek emergency treatment if you develop breathing problems; changes in vision; chest pain; severe, sudden headache; pain, swelling, warmth in the leg; trouble speaking; sudden numbness or weakness of the face, arm or leg. These can be signs that your condition has gotten worse.  If you are going to have surgery or other procedure, tell your doctor that you are taking this medicine.  What side effects may I notice from receiving this medicine?  Side effects that you should report to your doctor or health care professional as soon as possible:  -allergic reactions like skin rash, itching or hives, swelling of the face, lips, or tongue  -signs and symptoms of bleeding such as bloody or black, tarry stools; red or dark-brown urine; spitting up blood or brown material that looks like coffee grounds; red spots on the skin; unusual bruising or bleeding from the eye,  gums, or nose  This list may not describe all possible side effects. Call your doctor for medical advice about side effects. You may report side effects to FDA at 1-204-FDA-3565.  Where should I keep my medicine?  Keep out of the reach of children.  Store at room temperature between 20 and 25 degrees C (68 and 77 degrees F). Throw away any unused medicine after the expiration date.  NOTE: This sheet is a summary. It may not cover all possible information. If you have questions about this medicine, talk to your doctor, pharmacist, or health care provider.  © 2018 [employer] (2017-07-10 11:54:23)    Carvedilol tablets  What is this medicine?  CARVEDILOL (TYESHA ve dil ol) is a beta-blocker. Beta-blockers reduce the workload on the heart and help it to beat more regularly. This medicine is used to treat high blood pressure and heart failure.  This medicine may be used for other purposes; ask your health care provider or pharmacist if you have questions.  COMMON BRAND NAME(S): Coreg  What should I tell my health care provider before I take this medicine?  They need to know if you have any of these conditions:  -circulation problems  -diabetes  -history of heart attack or heart disease  -liver disease  -lung or breathing disease, like asthma or emphysema  -pheochromocytoma  -slow or irregular heartbeat  -thyroid disease  -an unusual or allergic reaction to carvedilol, other beta-blockers, medicines, foods, dyes, or preservatives  -pregnant or trying to get pregnant  -breast-feeding  How should I use this medicine?  Take this medicine by mouth with a glass of water. Follow the directions on the prescription label. It is best to take the tablets with food. Take your doses at regular intervals. Do not take your medicine more often than directed. Do not stop taking except on the advice of your doctor or health care professional.  Talk to your pediatrician regarding the use of this medicine in children. Special care  may be needed.  Overdosage: If you think you have taken too much of this medicine contact a poison control center or emergency room at once.  NOTE: This medicine is only for you. Do not share this medicine with others.  What if I miss a dose?  If you miss a dose, take it as soon as you can. If it is almost time for your next dose, take only that dose. Do not take double or extra doses.  What may interact with this medicine?  This medicine may interact with the following medications:  -certain medicines for blood pressure, heart disease, irregular heart beat  -certain medicines for depression, like fluoxetine or paroxetine  -certain medicines for diabetes, like glipizide or glyburide  -cimetidine  -clonidine  -cyclosporine  -digoxin  -MAOIs like Carbex, Eldepryl, Marplan, Nardil, and Parnate  -reserpine  -rifampin  This list may not describe all possible interactions. Give your health care provider a list of all the medicines, herbs, non-prescription drugs, or dietary supplements you use. Also tell them if you smoke, drink alcohol, or use illegal drugs. Some items may interact with your medicine.  What should I watch for while using this medicine?  Check your heart rate and blood pressure regularly while you are taking this medicine. Ask your doctor or health care professional what your heart rate and blood pressure should be, and when you should contact him or her. Do not stop taking this medicine suddenly. This could lead to serious heart-related effects.  Contact your doctor or health care professional if you have difficulty breathing while taking this drug.  Check your weight daily. Ask your doctor or health care professional when you should notify him/her of any weight gain.  You may get drowsy or dizzy. Do not drive, use machinery, or do anything that requires mental alertness until you know how this medicine affects you. To reduce the risk of dizzy or fainting spells, do not sit or stand up quickly. Alcohol  can make you more drowsy, and increase flushing and rapid heartbeats. Avoid alcoholic drinks.  If you have diabetes, check your blood sugar as directed. Tell your doctor if you have changes in your blood sugar while you are taking this medicine.  If you are going to have surgery, tell your doctor or health care professional that you are taking this medicine.  What side effects may I notice from receiving this medicine?  Side effects that you should report to your doctor or health care professional as soon as possible:  -allergic reactions like skin rash, itching or hives, swelling of the face, lips, or tongue  -breathing problems  -dark urine  -irregular heartbeat  -swollen legs or ankles  -vomiting  -yellowing of the eyes or skin  Side effects that usually do not require medical attention (report to your doctor or health care professional if they continue or are bothersome):  -change in sex drive or performance  -diarrhea  -dry eyes (especially if wearing contact lenses)  -dry, itching skin  -headache  -nausea  -unusually tired  This list may not describe all possible side effects. Call your doctor for medical advice about side effects. You may report side effects to FDA at 3-481-FDA-0456.  Where should I keep my medicine?  Keep out of the reach of children.  Store at room temperature below 30 degrees C (86 degrees F). Protect from moisture. Keep container tightly closed. Throw away any unused medicine after the expiration date.  NOTE: This sheet is a summary. It may not cover all possible information. If you have questions about this medicine, talk to your doctor, pharmacist, or health care provider.  © 2018 [employer] (2014-08-24 14:12:02)      Depression / Suicide Risk    As you are discharged from this [facility] facility, it is important to learn how to keep safe from harming yourself.    Recognize the warning signs:  · Abrupt changes in personality, positive or negative- including increase in  energy   · Giving away possessions  · Change in eating patterns- significant weight changes-  positive or negative  · Change in sleeping patterns- unable to sleep or sleeping all the time   · Unwillingness or inability to communicate  · Depression  · Unusual sadness, discouragement and loneliness  · Talk of wanting to die  · Neglect of personal appearance   · Rebelliousness- reckless behavior  · Withdrawal from people/activities they love  · Confusion- inability to concentrate     If you or a loved one observes any of these behaviors or has concerns about self-harm, here's what you can do:  · Talk about it- your feelings and reasons for harming yourself  · Remove any means that you might use to hurt yourself (examples: pills, rope, extension cords, firearm)  · Get professional help from the community (Mental Health, Substance Abuse, psychological counseling)  · Do not be alone:Call your Safe Contact- someone whom you trust who will be there for you.  · Call your local CRISIS HOTLINE 590-3093 or 650-271-1842  · Call your local Children's Mobile Crisis Response Team [place] (167) 260-2902 or wwwTrueView  · Call the toll free National Suicide Prevention Hotlines   · National Suicide Prevention Lifeline 482-207-QAMH (8311)  · National Hope Line Network 800-SUICIDE (249-2570)            Discharge Instructions per Renetta Apodaca M.D.    Please take your medications as prescribed. You will be taking Eliquis, ASA and plavix x30 days then STOP taking the ASA. Follow up with your PCP and Cardiology.    DIET: cardiac    ACTIVITY: as tolerated    DIAGNOSIS: NSTEMI    Return to ER if you develop new or worsening symptoms.

## 2019-10-24 NOTE — PROGRESS NOTES
Pt dc'd home. IV and monitor removed; monitor room notified. Pt left unit via wheelchair with son. Plavix given to patient by meds to bed. Personal belongings with pt when leaving unit. Pt given discharge instructions prior to leaving unit including prescription and when to visit with physician; verbalizes understanding. Copy of discharge instructions with pt and in the chart.

## 2023-06-20 NOTE — ASSESSMENT & PLAN NOTE
CALLED AND INFORMED THE PT OF THE RESULTS OR LEFT A MESSAGE LDL 86  - started on high intensity statin